# Patient Record
Sex: FEMALE | Race: BLACK OR AFRICAN AMERICAN | Employment: OTHER | URBAN - METROPOLITAN AREA
[De-identification: names, ages, dates, MRNs, and addresses within clinical notes are randomized per-mention and may not be internally consistent; named-entity substitution may affect disease eponyms.]

---

## 2023-09-30 ENCOUNTER — APPOINTMENT (OUTPATIENT)
Dept: CT IMAGING | Age: 88
End: 2023-09-30

## 2023-09-30 ENCOUNTER — HOSPITAL ENCOUNTER (EMERGENCY)
Age: 88
Discharge: ANOTHER HOSPITAL | End: 2023-10-01
Attending: EMERGENCY MEDICINE

## 2023-09-30 DIAGNOSIS — I71.23 ANEURYSM OF DESCENDING THORACIC AORTA, UNSPECIFIED WHETHER RUPTURED (HCC): ICD-10-CM

## 2023-09-30 DIAGNOSIS — R04.2 HEMOPTYSIS: Primary | ICD-10-CM

## 2023-09-30 LAB
ALBUMIN SERPL-MCNC: 3.7 G/DL (ref 3.5–4.6)
ALP SERPL-CCNC: 70 U/L (ref 40–130)
ALT SERPL-CCNC: 12 U/L (ref 0–33)
ANION GAP SERPL CALCULATED.3IONS-SCNC: 6 MEQ/L (ref 9–15)
AST SERPL-CCNC: 17 U/L (ref 0–35)
BASOPHILS # BLD: 0 K/UL (ref 0–0.2)
BASOPHILS NFR BLD: 0.5 %
BILIRUB SERPL-MCNC: 0.3 MG/DL (ref 0.2–0.7)
BUN SERPL-MCNC: 18 MG/DL (ref 8–23)
CALCIUM SERPL-MCNC: 8.9 MG/DL (ref 8.5–9.9)
CHLORIDE SERPL-SCNC: 107 MEQ/L (ref 95–107)
CO2 SERPL-SCNC: 24 MEQ/L (ref 20–31)
CREAT SERPL-MCNC: 1.04 MG/DL (ref 0.5–0.9)
EOSINOPHIL # BLD: 0.1 K/UL (ref 0–0.7)
EOSINOPHIL NFR BLD: 2.1 %
ERYTHROCYTE [DISTWIDTH] IN BLOOD BY AUTOMATED COUNT: 13.6 % (ref 11.5–14.5)
GLOBULIN SER CALC-MCNC: 2.8 G/DL (ref 2.3–3.5)
GLUCOSE SERPL-MCNC: 112 MG/DL (ref 70–99)
HCT VFR BLD AUTO: 32.6 % (ref 37–47)
HGB BLD-MCNC: 10.6 G/DL (ref 12–16)
INR PPP: 1.2
LYMPHOCYTES # BLD: 1.9 K/UL (ref 1–4.8)
LYMPHOCYTES NFR BLD: 44.4 %
MCH RBC QN AUTO: 30.2 PG (ref 27–31.3)
MCHC RBC AUTO-ENTMCNC: 32.5 % (ref 33–37)
MCV RBC AUTO: 92.9 FL (ref 79.4–94.8)
MONOCYTES # BLD: 0.2 K/UL (ref 0.2–0.8)
MONOCYTES NFR BLD: 5.4 %
NEUTROPHILS # BLD: 2 K/UL (ref 1.4–6.5)
NEUTS SEG NFR BLD: 46.9 %
PLATELET # BLD AUTO: 128 K/UL (ref 130–400)
POC CREATININE WHOLE BLOOD: 1
POTASSIUM SERPL-SCNC: 4.8 MEQ/L (ref 3.4–4.9)
PROT SERPL-MCNC: 6.5 G/DL (ref 6.3–8)
PROTHROMBIN TIME: 15.1 SEC (ref 12.3–14.9)
RBC # BLD AUTO: 3.51 M/UL (ref 4.2–5.4)
REASON FOR REJECTION: NORMAL
REJECTED TEST: NORMAL
SODIUM SERPL-SCNC: 137 MEQ/L (ref 135–144)
TROPONIN T SERPL-MCNC: <0.01 NG/ML (ref 0–0.01)
WBC # BLD AUTO: 4.3 K/UL (ref 4.8–10.8)

## 2023-09-30 PROCEDURE — 85610 PROTHROMBIN TIME: CPT

## 2023-09-30 PROCEDURE — 84484 ASSAY OF TROPONIN QUANT: CPT

## 2023-09-30 PROCEDURE — 99285 EMERGENCY DEPT VISIT HI MDM: CPT

## 2023-09-30 PROCEDURE — 85025 COMPLETE CBC W/AUTO DIFF WBC: CPT

## 2023-09-30 PROCEDURE — 6360000004 HC RX CONTRAST MEDICATION: Performed by: EMERGENCY MEDICINE

## 2023-09-30 PROCEDURE — 71275 CT ANGIOGRAPHY CHEST: CPT

## 2023-09-30 PROCEDURE — 36415 COLL VENOUS BLD VENIPUNCTURE: CPT

## 2023-09-30 PROCEDURE — 80053 COMPREHEN METABOLIC PANEL: CPT

## 2023-09-30 RX ORDER — LABETALOL HYDROCHLORIDE 5 MG/ML
10 INJECTION, SOLUTION INTRAVENOUS
Status: DISCONTINUED | OUTPATIENT
Start: 2023-09-30 | End: 2023-10-01 | Stop reason: HOSPADM

## 2023-09-30 RX ADMIN — IOPAMIDOL 75 ML: 612 INJECTION, SOLUTION INTRAVENOUS at 14:17

## 2023-09-30 ASSESSMENT — ENCOUNTER SYMPTOMS
SHORTNESS OF BREATH: 0
EYE PAIN: 0
NAUSEA: 0
SORE THROAT: 0
VOMITING: 0
CHEST TIGHTNESS: 0
ABDOMINAL PAIN: 0
COUGH: 1

## 2023-09-30 ASSESSMENT — LIFESTYLE VARIABLES
HOW OFTEN DO YOU HAVE A DRINK CONTAINING ALCOHOL: NEVER
HOW MANY STANDARD DRINKS CONTAINING ALCOHOL DO YOU HAVE ON A TYPICAL DAY: PATIENT DOES NOT DRINK

## 2023-09-30 ASSESSMENT — PAIN - FUNCTIONAL ASSESSMENT: PAIN_FUNCTIONAL_ASSESSMENT: NONE - DENIES PAIN

## 2023-09-30 NOTE — ED PROVIDER NOTES
alert and oriented to person, place, and time. Cranial Nerves: No cranial nerve deficit. Psychiatric:         Behavior: Behavior normal.         DIAGNOSTIC RESULTS     EKG: All EKG's are interpreted by the Emergency Department Physician who either signs or Co-signs this chart in the absence of a cardiologist.    Sinus bradycardia with PACs 59 bpm left axis deviation no acute ischemia read by ER physician    RADIOLOGY:   Non-plain film images such as CT, Ultrasound and MRI are read by the radiologist. Plain radiographic images are visualized and preliminarily interpreted by the emergency physician with the below findings:        Interpretation per the Radiologist below, if available at the time of this note:    CTA CHEST W WO CONTRAST PE Eval   Final Result   1. No evidence of pulmonary embolism      2. 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 There is a mild amount of mural thrombus. No evidence of mediastinal   hematoma. FINDINGS:  Aorta: Ascending aorta is normal in diameter measuring 3.5 cm. Just distal  to the left subclavian artery there is a large diameter somewhat short  segment aneurysmal dilatation of the thoracic aorta measuring up to 6.0 x 5.5  cm in diameter and approximately 7 cm in length. There is mild mural  thrombus. No evidence of mediastinal hematoma. Pulmonary artery: There are no filling defects within the pulmonary artery to  suggest pulmonary embolism. Pulmonary artery trunk is normal in size. There  is some dilatation of the right and left main pulmonary artery. Mediastinum: Heart appears normal in size. No pericardial effusion detected. There is minimal calcific ASVD of the coronary vessels. No evidence of  pathologic mediastinal lymphadenopathy. No axillary adenopathy.      Lungs/Pleura: Patchy areas of alveolar opacity are noted with a

## 2023-10-01 VITALS
OXYGEN SATURATION: 100 % | SYSTOLIC BLOOD PRESSURE: 144 MMHG | HEART RATE: 53 BPM | BODY MASS INDEX: 23.9 KG/M2 | TEMPERATURE: 99 F | HEIGHT: 64 IN | DIASTOLIC BLOOD PRESSURE: 65 MMHG | RESPIRATION RATE: 14 BRPM | WEIGHT: 140 LBS

## 2023-10-01 LAB
PERFORMED ON: ABNORMAL
POC CREATININE: 1 MG/DL (ref 0.6–1.2)
POC SAMPLE TYPE: ABNORMAL

## 2023-10-01 NOTE — ED NOTES
Report given to JANICE Tamayo taking care of pt going to [address] [place] [address] bed 4. No acute distress noted at this time. Resps even non labored.   Skin p/w/d.  VSS     Marlen Mcleod RN  10/01/23 2816

## 2023-10-01 NOTE — ED NOTES
Still waiting for bed at [facility]. Patient updated. Patient resting comfortably at this time.       Clarence Parra RN  09/30/23 2046

## 2023-10-01 NOTE — ED NOTES
Called [address] to have them contact [facility] transfer center for our attending to speak to.      Tawny MARISCAL Page  09/30/23 2030

## 2023-10-06 LAB
EKG ATRIAL RATE: 59 BPM
EKG P AXIS: 60 DEGREES
EKG P-R INTERVAL: 182 MS
EKG Q-T INTERVAL: 428 MS
EKG QRS DURATION: 80 MS
EKG QTC CALCULATION (BAZETT): 423 MS
EKG R AXIS: -38 DEGREES
EKG T AXIS: 63 DEGREES
EKG VENTRICULAR RATE: 59 BPM

## 2024-06-26 ENCOUNTER — APPOINTMENT (OUTPATIENT)
Dept: CT IMAGING | Age: 89
DRG: 481 | End: 2024-06-26
Payer: MEDICARE

## 2024-06-26 ENCOUNTER — APPOINTMENT (OUTPATIENT)
Dept: GENERAL RADIOLOGY | Age: 89
DRG: 481 | End: 2024-06-26
Payer: MEDICARE

## 2024-06-26 ENCOUNTER — HOSPITAL ENCOUNTER (INPATIENT)
Age: 89
LOS: 5 days | Discharge: NURSING, REHAB OR LONG-TERM CARE | DRG: 481 | End: 2024-07-01
Attending: EMERGENCY MEDICINE | Admitting: SURGERY
Payer: MEDICARE

## 2024-06-26 DIAGNOSIS — S72.145A CLOSED NONDISPLACED INTERTROCHANTERIC FRACTURE OF LEFT FEMUR, INITIAL ENCOUNTER (HCC): ICD-10-CM
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
## 2024-06-26 DIAGNOSIS — G89.11 ACUTE PAIN DUE TO TRAUMA: ICD-10-CM

## 2024-06-26 DIAGNOSIS — G89.18 ACUTE POST-OPERATIVE PAIN: ICD-10-CM

## 2024-06-26 PROBLEM — S72.142A INTERTROCHANTERIC FRACTURE, CLOSED, LEFT, INITIAL ENCOUNTER (HCC): Status: ACTIVE | Noted: 2024-06-26

## 2024-06-26 LAB
ALBUMIN SERPL-MCNC: 4 G/DL (ref 3.5–4.6)
ALP SERPL-CCNC: 80 U/L (ref 40–130)
ALT SERPL-CCNC: 15 U/L (ref 0–33)
ANION GAP SERPL CALCULATED.3IONS-SCNC: 12 MEQ/L (ref 9–15)
AST SERPL-CCNC: 19 U/L (ref 0–35)
BASOPHILS # BLD: 0 K/UL (ref 0–0.2)
BASOPHILS NFR BLD: 0.4 %
BILIRUB SERPL-MCNC: 0.5 MG/DL (ref 0.2–0.7)
BUN SERPL-MCNC: 21 MG/DL (ref 8–23)
CALCIUM SERPL-MCNC: 9.2 MG/DL (ref 8.5–9.9)
CHLORIDE SERPL-SCNC: 106 MEQ/L (ref 95–107)
CO2 SERPL-SCNC: 23 MEQ/L (ref 20–31)
CREAT SERPL-MCNC: 1.1 MG/DL (ref 0.5–0.9)
EOSINOPHIL # BLD: 0.1 K/UL (ref 0–0.7)
EOSINOPHIL NFR BLD: 1.5 %
ERYTHROCYTE [DISTWIDTH] IN BLOOD BY AUTOMATED COUNT: 13.7 % (ref 11.5–14.5)
GLOBULIN SER CALC-MCNC: 2.8 G/DL (ref 2.3–3.5)
GLUCOSE SERPL-MCNC: 107 MG/DL (ref 70–99)
HCT VFR BLD AUTO: 35 % (ref 37–47)
HGB BLD-MCNC: 11.3 G/DL (ref 12–16)
INR PPP: 1.1
LYMPHOCYTES # BLD: 1.7 K/UL (ref 1–4.8)
LYMPHOCYTES NFR BLD: 35.9 %
MAGNESIUM SERPL-MCNC: 2.2 MG/DL (ref 1.7–2.4)
MCH RBC QN AUTO: 30.1 PG (ref 27–31.3)
MCHC RBC AUTO-ENTMCNC: 32.3 % (ref 33–37)
MCV RBC AUTO: 93.1 FL (ref 79.4–94.8)
MONOCYTES # BLD: 0.3 K/UL (ref 0.2–0.8)
MONOCYTES NFR BLD: 6.3 %
NEUTROPHILS # BLD: 2.6 K/UL (ref 1.4–6.5)
NEUTS SEG NFR BLD: 55.3 %
PERFORMED ON: ABNORMAL
PLATELET # BLD AUTO: 122 K/UL (ref 130–400)
POC CREATININE: 1.2 MG/DL (ref 0.6–1.2)
POC SAMPLE TYPE: ABNORMAL
POTASSIUM SERPL-SCNC: 4.8 MEQ/L (ref 3.4–4.9)
PROT SERPL-MCNC: 6.8 G/DL (ref 6.3–8)
PROTHROMBIN TIME: 14.2 SEC (ref 12.3–14.9)
RBC # BLD AUTO: 3.76 M/UL (ref 4.2–5.4)
SODIUM SERPL-SCNC: 141 MEQ/L (ref 135–144)
TROPONIN, HIGH SENSITIVITY: 16 NG/L (ref 0–19)
WBC # BLD AUTO: 4.7 K/UL (ref 4.8–10.8)

## 2024-06-26 PROCEDURE — 36415 COLL VENOUS BLD VENIPUNCTURE: CPT

## 2024-06-26 PROCEDURE — 96374 THER/PROPH/DIAG INJ IV PUSH: CPT

## 2024-06-26 PROCEDURE — 85610 PROTHROMBIN TIME: CPT

## 2024-06-26 PROCEDURE — 93005 ELECTROCARDIOGRAM TRACING: CPT | Performed by: EMERGENCY MEDICINE

## 2024-06-26 PROCEDURE — 71045 X-RAY EXAM CHEST 1 VIEW: CPT

## 2024-06-26 PROCEDURE — 74177 CT ABD & PELVIS W/CONTRAST: CPT

## 2024-06-26 PROCEDURE — 99221 1ST HOSP IP/OBS SF/LOW 40: CPT | Performed by: SURGERY

## 2024-06-26 PROCEDURE — 85025 COMPLETE CBC W/AUTO DIFF WBC: CPT

## 2024-06-26 PROCEDURE — 83735 ASSAY OF MAGNESIUM: CPT

## 2024-06-26 PROCEDURE — 6360000002 HC RX W HCPCS: Performed by: EMERGENCY MEDICINE

## 2024-06-26 PROCEDURE — 73552 X-RAY EXAM OF FEMUR 2/>: CPT

## 2024-06-26 PROCEDURE — 70450 CT HEAD/BRAIN W/O DYE: CPT

## 2024-06-26 PROCEDURE — 6830039000 HC L3 TRAUMA ALERT

## 2024-06-26 PROCEDURE — 6360000004 HC RX CONTRAST MEDICATION: Performed by: EMERGENCY MEDICINE

## 2024-06-26 PROCEDURE — 80053 COMPREHEN METABOLIC PANEL: CPT

## 2024-06-26 PROCEDURE — 99285 EMERGENCY DEPT VISIT HI MDM: CPT

## 2024-06-26 PROCEDURE — 1210000000 HC MED SURG R&B

## 2024-06-26 PROCEDURE — 72125 CT NECK SPINE W/O DYE: CPT

## 2024-06-26 PROCEDURE — 96375 TX/PRO/DX INJ NEW DRUG ADDON: CPT

## 2024-06-26 PROCEDURE — 73501 X-RAY EXAM HIP UNI 1 VIEW: CPT

## 2024-06-26 PROCEDURE — 6370000000 HC RX 637 (ALT 250 FOR IP): Performed by: SURGERY

## 2024-06-26 PROCEDURE — 2580000003 HC RX 258: Performed by: SURGERY

## 2024-06-26 PROCEDURE — 84484 ASSAY OF TROPONIN QUANT: CPT

## 2024-06-26 PROCEDURE — 71260 CT THORAX DX C+: CPT

## 2024-06-26 RX ORDER — SENNA AND DOCUSATE SODIUM 50; 8.6 MG/1; MG/1
1 TABLET, FILM COATED ORAL 2 TIMES DAILY
Status: DISCONTINUED | OUTPATIENT
Start: 2024-06-26 | End: 2024-07-01 | Stop reason: HOSPADM

## 2024-06-26 RX ORDER — LISINOPRIL 20 MG/1
20 TABLET ORAL NIGHTLY
Status: ON HOLD | COMMUNITY
End: 2024-06-26

## 2024-06-26 RX ORDER — METOPROLOL TARTRATE 50 MG/1
50 TABLET, FILM COATED ORAL 2 TIMES DAILY
Status: DISCONTINUED | OUTPATIENT
Start: 2024-06-26 | End: 2024-07-01 | Stop reason: HOSPADM

## 2024-06-26 RX ORDER — LISINOPRIL 20 MG/1
20 TABLET ORAL DAILY
COMMUNITY
Start: 2023-10-18

## 2024-06-26 RX ORDER — SODIUM CHLORIDE 0.9 % (FLUSH) 0.9 %
5-40 SYRINGE (ML) INJECTION EVERY 12 HOURS SCHEDULED
Status: DISCONTINUED | OUTPATIENT
Start: 2024-06-26 | End: 2024-07-01 | Stop reason: HOSPADM

## 2024-06-26 RX ORDER — ATORVASTATIN CALCIUM 40 MG/1
40 TABLET, FILM COATED ORAL NIGHTLY
Status: DISCONTINUED | OUTPATIENT
Start: 2024-06-26 | End: 2024-07-01 | Stop reason: HOSPADM

## 2024-06-26 RX ORDER — OXYCODONE HYDROCHLORIDE 5 MG/1
5 TABLET ORAL EVERY 4 HOURS PRN
Status: DISCONTINUED | OUTPATIENT
Start: 2024-06-26 | End: 2024-06-27

## 2024-06-26 RX ORDER — SODIUM CHLORIDE, SODIUM LACTATE, POTASSIUM CHLORIDE, CALCIUM CHLORIDE 600; 310; 30; 20 MG/100ML; MG/100ML; MG/100ML; MG/100ML
INJECTION, SOLUTION INTRAVENOUS CONTINUOUS
Status: DISCONTINUED | OUTPATIENT
Start: 2024-06-26 | End: 2024-06-29

## 2024-06-26 RX ORDER — FENTANYL CITRATE 0.05 MG/ML
50 INJECTION, SOLUTION INTRAMUSCULAR; INTRAVENOUS ONCE
Status: COMPLETED | OUTPATIENT
Start: 2024-06-26 | End: 2024-06-26

## 2024-06-26 RX ORDER — ATORVASTATIN CALCIUM 40 MG/1
40 TABLET, FILM COATED ORAL NIGHTLY
Status: ON HOLD | COMMUNITY
End: 2024-06-26

## 2024-06-26 RX ORDER — ACETAMINOPHEN 325 MG/1
650 TABLET ORAL EVERY 6 HOURS
Status: DISCONTINUED | OUTPATIENT
Start: 2024-06-26 | End: 2024-07-01 | Stop reason: HOSPADM

## 2024-06-26 RX ORDER — ONDANSETRON 2 MG/ML
4 INJECTION INTRAMUSCULAR; INTRAVENOUS EVERY 6 HOURS PRN
Status: DISCONTINUED | OUTPATIENT
Start: 2024-06-26 | End: 2024-07-01 | Stop reason: HOSPADM

## 2024-06-26 RX ORDER — METOPROLOL TARTRATE 50 MG/1
50 TABLET, FILM COATED ORAL 2 TIMES DAILY
Status: ON HOLD | COMMUNITY
End: 2024-06-26

## 2024-06-26 RX ORDER — AMLODIPINE BESYLATE 5 MG/1
5 TABLET ORAL DAILY
Status: DISCONTINUED | OUTPATIENT
Start: 2024-06-27 | End: 2024-07-01 | Stop reason: HOSPADM

## 2024-06-26 RX ORDER — AMLODIPINE BESYLATE 5 MG/1
1 TABLET ORAL DAILY
COMMUNITY
Start: 2023-09-14

## 2024-06-26 RX ORDER — ATORVASTATIN CALCIUM 40 MG/1
40 TABLET, FILM COATED ORAL NIGHTLY
COMMUNITY
Start: 2024-04-05

## 2024-06-26 RX ORDER — ASPIRIN 81 MG/1
81 TABLET, CHEWABLE ORAL DAILY
Status: ON HOLD | COMMUNITY
End: 2024-07-01

## 2024-06-26 RX ORDER — ONDANSETRON 4 MG/1
4 TABLET, ORALLY DISINTEGRATING ORAL EVERY 8 HOURS PRN
Status: DISCONTINUED | OUTPATIENT
Start: 2024-06-26 | End: 2024-07-01 | Stop reason: HOSPADM

## 2024-06-26 RX ORDER — AMLODIPINE BESYLATE 5 MG/1
5 TABLET ORAL DAILY
Status: ON HOLD | COMMUNITY
End: 2024-06-26

## 2024-06-26 RX ORDER — MORPHINE SULFATE 4 MG/ML
4 INJECTION, SOLUTION INTRAMUSCULAR; INTRAVENOUS ONCE
Status: COMPLETED | OUTPATIENT
Start: 2024-06-26 | End: 2024-06-26

## 2024-06-26 RX ORDER — METOPROLOL TARTRATE 50 MG/1
50 TABLET, FILM COATED ORAL 2 TIMES DAILY
COMMUNITY
Start: 2024-06-24 | End: 2024-09-22

## 2024-06-26 RX ORDER — ACETAMINOPHEN 325 MG/1
650 TABLET ORAL EVERY 4 HOURS PRN
COMMUNITY
Start: 2023-10-09

## 2024-06-26 RX ORDER — SODIUM CHLORIDE 9 MG/ML
INJECTION, SOLUTION INTRAVENOUS PRN
Status: DISCONTINUED | OUTPATIENT
Start: 2024-06-26 | End: 2024-07-01 | Stop reason: HOSPADM

## 2024-06-26 RX ORDER — OXYCODONE HYDROCHLORIDE 5 MG/1
2.5 TABLET ORAL EVERY 4 HOURS PRN
Status: DISCONTINUED | OUTPATIENT
Start: 2024-06-26 | End: 2024-06-27

## 2024-06-26 RX ORDER — SODIUM CHLORIDE 0.9 % (FLUSH) 0.9 %
5-40 SYRINGE (ML) INJECTION PRN
Status: DISCONTINUED | OUTPATIENT
Start: 2024-06-26 | End: 2024-07-01 | Stop reason: HOSPADM

## 2024-06-26 RX ORDER — ASPIRIN 81 MG/1
81 TABLET, CHEWABLE ORAL DAILY
Status: ON HOLD | COMMUNITY
End: 2024-06-26

## 2024-06-26 RX ORDER — AMOXICILLIN 250 MG
2 CAPSULE ORAL 2 TIMES DAILY PRN
COMMUNITY
Start: 2023-10-09

## 2024-06-26 RX ADMIN — IOPAMIDOL 75 ML: 755 INJECTION, SOLUTION INTRAVENOUS at 20:40

## 2024-06-26 RX ADMIN — SENNOSIDES AND DOCUSATE SODIUM 1 TABLET: 50; 8.6 TABLET ORAL at 23:22

## 2024-06-26 RX ADMIN — FENTANYL CITRATE 50 MCG: 50 INJECTION INTRAMUSCULAR; INTRAVENOUS at 21:30

## 2024-06-26 RX ADMIN — ATORVASTATIN CALCIUM 40 MG: 40 TABLET, FILM COATED ORAL at 23:22

## 2024-06-26 RX ADMIN — METOPROLOL TARTRATE 50 MG: 50 TABLET, FILM COATED ORAL at 23:22

## 2024-06-26 RX ADMIN — SODIUM CHLORIDE, POTASSIUM CHLORIDE, SODIUM LACTATE AND CALCIUM CHLORIDE: 600; 310; 30; 20 INJECTION, SOLUTION INTRAVENOUS at 23:19

## 2024-06-26 RX ADMIN — MORPHINE SULFATE 4 MG: 4 INJECTION, SOLUTION INTRAMUSCULAR; INTRAVENOUS at 19:56

## 2024-06-26 RX ADMIN — ACETAMINOPHEN 325MG 650 MG: 325 TABLET ORAL at 23:22

## 2024-06-26 RX ADMIN — Medication 10 ML: at 23:25

## 2024-06-26 ASSESSMENT — PAIN DESCRIPTION - ORIENTATION
ORIENTATION: LEFT

## 2024-06-26 ASSESSMENT — PAIN DESCRIPTION - DESCRIPTORS
DESCRIPTORS: BURNING
DESCRIPTORS: ACHING

## 2024-06-26 ASSESSMENT — PAIN SCALES - GENERAL
PAINLEVEL_OUTOF10: 6
PAINLEVEL_OUTOF10: 7
PAINLEVEL_OUTOF10: 7
PAINLEVEL_OUTOF10: 4
PAINLEVEL_OUTOF10: 9

## 2024-06-26 ASSESSMENT — PAIN DESCRIPTION - LOCATION
LOCATION: HIP

## 2024-06-26 ASSESSMENT — PAIN DESCRIPTION - PAIN TYPE: TYPE: ACUTE PAIN

## 2024-06-26 ASSESSMENT — PAIN - FUNCTIONAL ASSESSMENT
PAIN_FUNCTIONAL_ASSESSMENT: ACTIVITIES ARE NOT PREVENTED
PAIN_FUNCTIONAL_ASSESSMENT: 0-10

## 2024-06-26 ASSESSMENT — PAIN DESCRIPTION - ONSET: ONSET: OTHER (COMMENT)

## 2024-06-26 NOTE — ED PROVIDER NOTES
significant displacement.  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     No superimposed acute cardiopulmonary process. [SG]   2121 XR FEMUR LEFT (MIN 2 VIEWS)  IMPRESSION:     1.  Intratrochanteric fracture of the left proximal femur without significant  angulation and with slight displacement of the lesser trochanter fragment.     2.  Chondrocalcinosis within the knee.      [SG]   2121 XR HIP 1 VW W PELVIS LEFT  IMPRESSION:     Intratrochanteric fracture of the left proximal femur with slight  displacement of the lesser trochanter fragment but no significant angulation.  Femoral head to acetabular alignment remains intact. [SG]   2128 Discussed case w/ ortho and trauma regarding patient and are agreeable with consult and admission respectively.  [SG]   2130 Updated patient and family regarding the imaging results [SG]      ED Course User Index  [SG] Luis Powell MD       CONSULTS:  None    PROCEDURES:  Unless otherwise noted below, none     Procedures    FINAL IMPRESSION    No diagnosis found.      DISPOSITION/PLAN   DISPOSITION        PATIENT REFERRED TO:  No follow-up provider specified.    DISCHARGE MEDICATIONS:  New Prescriptions    No medications on file     Controlled Substances Monitoring:          No data to display                (Please note that portions of this note were completed with a voice recognition program.  Efforts were made to edit the dictations but occasionally words are mis-transcribed.)    Luis Powell MD (electronically signed)  Attending Emergency Physician     Luis Powell MD  06/26/24 9570

## 2024-06-26 NOTE — ED TRIAGE NOTES
Pt to ER per  EMS with C/O left hip pain after a fall today. Pt states she was on the phone and when she turned around she said she blacked out and started to fall. On arrival pt left leg deformity noted with external rotation. Left foot pedal pulse palpable. PT was given zofran 4 mg IV and fentanyl 100 mcg IV prior to ER arrival.

## 2024-06-27 ENCOUNTER — ANESTHESIA (OUTPATIENT)
Dept: OPERATING ROOM | Age: 89
End: 2024-06-27
Payer: MEDICARE

## 2024-06-27 ENCOUNTER — ANESTHESIA EVENT (OUTPATIENT)
Dept: OPERATING ROOM | Age: 89
End: 2024-06-27
Payer: MEDICARE

## 2024-06-27 ENCOUNTER — APPOINTMENT (OUTPATIENT)
Dept: GENERAL RADIOLOGY | Age: 89
DRG: 481 | End: 2024-06-27
Payer: MEDICARE
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
## 2024-06-27 PROBLEM — G89.11 ACUTE PAIN DUE TO TRAUMA: Status: ACTIVE | Noted: 2024-06-26

## 2024-06-27 LAB
ABO + RH BLD: NORMAL
ANION GAP SERPL CALCULATED.3IONS-SCNC: 10 MEQ/L (ref 9–15)
BASOPHILS # BLD: 0 K/UL (ref 0–0.2)
BASOPHILS NFR BLD: 0.1 %
BLD GP AB SCN SERPL QL: NORMAL
BUN SERPL-MCNC: 22 MG/DL (ref 8–23)
CALCIUM SERPL-MCNC: 8.6 MG/DL (ref 8.5–9.9)
CHLORIDE SERPL-SCNC: 108 MEQ/L (ref 95–107)
CO2 SERPL-SCNC: 23 MEQ/L (ref 20–31)
CREAT SERPL-MCNC: 1.14 MG/DL (ref 0.5–0.9)
EKG ATRIAL RATE: 62 BPM
EKG P AXIS: 80 DEGREES
EKG P-R INTERVAL: 170 MS
EKG Q-T INTERVAL: 416 MS
EKG QRS DURATION: 76 MS
EKG QTC CALCULATION (BAZETT): 422 MS
EKG R AXIS: -35 DEGREES
EKG T AXIS: 43 DEGREES
EKG VENTRICULAR RATE: 62 BPM
EOSINOPHIL # BLD: 0 K/UL (ref 0–0.7)
EOSINOPHIL NFR BLD: 0 %
ERYTHROCYTE [DISTWIDTH] IN BLOOD BY AUTOMATED COUNT: 13.7 % (ref 11.5–14.5)
ETHANOL PERCENT: NORMAL G/DL
ETHANOLAMINE SERPL-MCNC: <10 MG/DL (ref 0–0.08)
GLUCOSE SERPL-MCNC: 121 MG/DL (ref 70–99)
HCT VFR BLD AUTO: 29.5 % (ref 37–47)
HGB BLD-MCNC: 9.5 G/DL (ref 12–16)
LYMPHOCYTES # BLD: 1.4 K/UL (ref 1–4.8)
LYMPHOCYTES NFR BLD: 18.6 %
MCH RBC QN AUTO: 29.7 PG (ref 27–31.3)
MCHC RBC AUTO-ENTMCNC: 32.2 % (ref 33–37)
MCV RBC AUTO: 92.2 FL (ref 79.4–94.8)
MONOCYTES # BLD: 0.5 K/UL (ref 0.2–0.8)
MONOCYTES NFR BLD: 6.8 %
NEUTROPHILS # BLD: 5.4 K/UL (ref 1.4–6.5)
NEUTS SEG NFR BLD: 74.1 %
PLATELET # BLD AUTO: 125 K/UL (ref 130–400)
POTASSIUM SERPL-SCNC: 4.9 MEQ/L (ref 3.4–4.9)
RBC # BLD AUTO: 3.2 M/UL (ref 4.2–5.4)
SODIUM SERPL-SCNC: 141 MEQ/L (ref 135–144)
WBC # BLD AUTO: 7.3 K/UL (ref 4.8–10.8)

## 2024-06-27 PROCEDURE — 86901 BLOOD TYPING SEROLOGIC RH(D): CPT

## 2024-06-27 PROCEDURE — 6360000002 HC RX W HCPCS: Performed by: ANESTHESIOLOGY

## 2024-06-27 PROCEDURE — 2720000010 HC SURG SUPPLY STERILE: Performed by: STUDENT IN AN ORGANIZED HEALTH CARE EDUCATION/TRAINING PROGRAM

## 2024-06-27 PROCEDURE — APPNB15 APP NON BILLABLE TIME 0-15 MINS: Performed by: PHYSICIAN ASSISTANT

## 2024-06-27 PROCEDURE — 6360000002 HC RX W HCPCS: Performed by: NURSE ANESTHETIST, CERTIFIED REGISTERED

## 2024-06-27 PROCEDURE — 85025 COMPLETE CBC W/AUTO DIFF WBC: CPT

## 2024-06-27 PROCEDURE — 6360000002 HC RX W HCPCS: Performed by: ANESTHESIOLOGIST ASSISTANT

## 2024-06-27 PROCEDURE — 30233N1 TRANSFUSION OF NONAUTOLOGOUS RED BLOOD CELLS INTO PERIPHERAL VEIN, PERCUTANEOUS APPROACH: ICD-10-PCS | Performed by: SURGERY

## 2024-06-27 PROCEDURE — 2580000003 HC RX 258: Performed by: PHYSICIAN ASSISTANT

## 2024-06-27 PROCEDURE — 0QS706Z REPOSITION LEFT UPPER FEMUR WITH INTRAMEDULLARY INTERNAL FIXATION DEVICE, OPEN APPROACH: ICD-10-PCS | Performed by: STUDENT IN AN ORGANIZED HEALTH CARE EDUCATION/TRAINING PROGRAM

## 2024-06-27 PROCEDURE — 2580000003 HC RX 258: Performed by: STUDENT IN AN ORGANIZED HEALTH CARE EDUCATION/TRAINING PROGRAM

## 2024-06-27 PROCEDURE — 86923 COMPATIBILITY TEST ELECTRIC: CPT

## 2024-06-27 PROCEDURE — 1210000000 HC MED SURG R&B

## 2024-06-27 PROCEDURE — 2500000003 HC RX 250 WO HCPCS: Performed by: ANESTHESIOLOGY

## 2024-06-27 PROCEDURE — 86850 RBC ANTIBODY SCREEN: CPT

## 2024-06-27 PROCEDURE — 6370000000 HC RX 637 (ALT 250 FOR IP): Performed by: SURGERY

## 2024-06-27 PROCEDURE — 99223 1ST HOSP IP/OBS HIGH 75: CPT | Performed by: ORTHOPAEDIC SURGERY

## 2024-06-27 PROCEDURE — 86900 BLOOD TYPING SEROLOGIC ABO: CPT

## 2024-06-27 PROCEDURE — 3600000014 HC SURGERY LEVEL 4 ADDTL 15MIN: Performed by: STUDENT IN AN ORGANIZED HEALTH CARE EDUCATION/TRAINING PROGRAM

## 2024-06-27 PROCEDURE — P9016 RBC LEUKOCYTES REDUCED: HCPCS

## 2024-06-27 PROCEDURE — 7100000001 HC PACU RECOVERY - ADDTL 15 MIN: Performed by: STUDENT IN AN ORGANIZED HEALTH CARE EDUCATION/TRAINING PROGRAM

## 2024-06-27 PROCEDURE — 6360000002 HC RX W HCPCS: Performed by: STUDENT IN AN ORGANIZED HEALTH CARE EDUCATION/TRAINING PROGRAM

## 2024-06-27 PROCEDURE — 3700000001 HC ADD 15 MINUTES (ANESTHESIA): Performed by: STUDENT IN AN ORGANIZED HEALTH CARE EDUCATION/TRAINING PROGRAM

## 2024-06-27 PROCEDURE — A4217 STERILE WATER/SALINE, 500 ML: HCPCS | Performed by: STUDENT IN AN ORGANIZED HEALTH CARE EDUCATION/TRAINING PROGRAM

## 2024-06-27 PROCEDURE — 64447 NJX AA&/STRD FEMORAL NRV IMG: CPT | Performed by: ANESTHESIOLOGY

## 2024-06-27 PROCEDURE — 94150 VITAL CAPACITY TEST: CPT

## 2024-06-27 PROCEDURE — 6370000000 HC RX 637 (ALT 250 FOR IP): Performed by: PHYSICIAN ASSISTANT

## 2024-06-27 PROCEDURE — 82077 ASSAY SPEC XCP UR&BREATH IA: CPT

## 2024-06-27 PROCEDURE — 93010 ELECTROCARDIOGRAM REPORT: CPT | Performed by: INTERNAL MEDICINE

## 2024-06-27 PROCEDURE — 7100000000 HC PACU RECOVERY - FIRST 15 MIN: Performed by: STUDENT IN AN ORGANIZED HEALTH CARE EDUCATION/TRAINING PROGRAM

## 2024-06-27 PROCEDURE — C1769 GUIDE WIRE: HCPCS | Performed by: STUDENT IN AN ORGANIZED HEALTH CARE EDUCATION/TRAINING PROGRAM

## 2024-06-27 PROCEDURE — 3600000004 HC SURGERY LEVEL 4 BASE: Performed by: STUDENT IN AN ORGANIZED HEALTH CARE EDUCATION/TRAINING PROGRAM

## 2024-06-27 PROCEDURE — 2709999900 HC NON-CHARGEABLE SUPPLY: Performed by: STUDENT IN AN ORGANIZED HEALTH CARE EDUCATION/TRAINING PROGRAM

## 2024-06-27 PROCEDURE — 2500000003 HC RX 250 WO HCPCS: Performed by: STUDENT IN AN ORGANIZED HEALTH CARE EDUCATION/TRAINING PROGRAM

## 2024-06-27 PROCEDURE — 2580000003 HC RX 258: Performed by: NURSE PRACTITIONER

## 2024-06-27 PROCEDURE — 36415 COLL VENOUS BLD VENIPUNCTURE: CPT

## 2024-06-27 PROCEDURE — 80048 BASIC METABOLIC PNL TOTAL CA: CPT

## 2024-06-27 PROCEDURE — 6360000002 HC RX W HCPCS: Performed by: NURSE PRACTITIONER

## 2024-06-27 PROCEDURE — 3700000000 HC ANESTHESIA ATTENDED CARE: Performed by: STUDENT IN AN ORGANIZED HEALTH CARE EDUCATION/TRAINING PROGRAM

## 2024-06-27 PROCEDURE — 2580000003 HC RX 258: Performed by: SURGERY

## 2024-06-27 PROCEDURE — C1713 ANCHOR/SCREW BN/BN,TIS/BN: HCPCS | Performed by: STUDENT IN AN ORGANIZED HEALTH CARE EDUCATION/TRAINING PROGRAM

## 2024-06-27 DEVICE — SCREW BNE L40MM DIA5MM ST TIB LT GRN TI ST CANN LOK FULL: Type: IMPLANTABLE DEVICE | Status: FUNCTIONAL

## 2024-06-27 DEVICE — IMPLANTABLE DEVICE: Type: IMPLANTABLE DEVICE | Status: FUNCTIONAL

## 2024-06-27 RX ORDER — OXYCODONE HYDROCHLORIDE 5 MG/1
7.5 TABLET ORAL EVERY 4 HOURS PRN
Status: DISCONTINUED | OUTPATIENT
Start: 2024-06-27 | End: 2024-06-28

## 2024-06-27 RX ORDER — SODIUM CHLORIDE 0.9 % (FLUSH) 0.9 %
5-40 SYRINGE (ML) INJECTION EVERY 12 HOURS SCHEDULED
Status: DISCONTINUED | OUTPATIENT
Start: 2024-06-27 | End: 2024-06-27 | Stop reason: HOSPADM

## 2024-06-27 RX ORDER — OXYCODONE HYDROCHLORIDE 5 MG/1
5 TABLET ORAL
Status: DISCONTINUED | OUTPATIENT
Start: 2024-06-27 | End: 2024-06-27 | Stop reason: HOSPADM

## 2024-06-27 RX ORDER — LIDOCAINE HYDROCHLORIDE 20 MG/ML
INJECTION, SOLUTION INTRAVENOUS PRN
Status: DISCONTINUED | OUTPATIENT
Start: 2024-06-27 | End: 2024-06-27 | Stop reason: SDUPTHER

## 2024-06-27 RX ORDER — FENTANYL CITRATE 0.05 MG/ML
25 INJECTION, SOLUTION INTRAMUSCULAR; INTRAVENOUS EVERY 10 MIN PRN
Status: DISCONTINUED | OUTPATIENT
Start: 2024-06-27 | End: 2024-06-27 | Stop reason: HOSPADM

## 2024-06-27 RX ORDER — ONDANSETRON 2 MG/ML
4 INJECTION INTRAMUSCULAR; INTRAVENOUS
Status: DISCONTINUED | OUTPATIENT
Start: 2024-06-27 | End: 2024-06-27 | Stop reason: HOSPADM

## 2024-06-27 RX ORDER — METHOCARBAMOL 500 MG/1
500 TABLET, FILM COATED ORAL 4 TIMES DAILY
Status: DISCONTINUED | OUTPATIENT
Start: 2024-06-27 | End: 2024-07-01 | Stop reason: HOSPADM

## 2024-06-27 RX ORDER — DIPHENHYDRAMINE HYDROCHLORIDE 50 MG/ML
12.5 INJECTION INTRAMUSCULAR; INTRAVENOUS
Status: DISCONTINUED | OUTPATIENT
Start: 2024-06-27 | End: 2024-06-27 | Stop reason: HOSPADM

## 2024-06-27 RX ORDER — PROPOFOL 10 MG/ML
INJECTION, EMULSION INTRAVENOUS PRN
Status: DISCONTINUED | OUTPATIENT
Start: 2024-06-27 | End: 2024-06-27 | Stop reason: SDUPTHER

## 2024-06-27 RX ORDER — NALOXONE HYDROCHLORIDE 0.4 MG/ML
INJECTION, SOLUTION INTRAMUSCULAR; INTRAVENOUS; SUBCUTANEOUS PRN
Status: DISCONTINUED | OUTPATIENT
Start: 2024-06-27 | End: 2024-06-27 | Stop reason: HOSPADM

## 2024-06-27 RX ORDER — SODIUM CHLORIDE 0.9 % (FLUSH) 0.9 %
5-40 SYRINGE (ML) INJECTION PRN
Status: DISCONTINUED | OUTPATIENT
Start: 2024-06-27 | End: 2024-06-27 | Stop reason: HOSPADM

## 2024-06-27 RX ORDER — OXYCODONE HYDROCHLORIDE 5 MG/1
5 TABLET ORAL EVERY 4 HOURS PRN
Status: DISCONTINUED | OUTPATIENT
Start: 2024-06-27 | End: 2024-06-28

## 2024-06-27 RX ORDER — ASPIRIN 81 MG/1
81 TABLET, CHEWABLE ORAL DAILY
Status: DISCONTINUED | OUTPATIENT
Start: 2024-06-27 | End: 2024-07-01 | Stop reason: HOSPADM

## 2024-06-27 RX ORDER — METOCLOPRAMIDE HYDROCHLORIDE 5 MG/ML
10 INJECTION INTRAMUSCULAR; INTRAVENOUS
Status: DISCONTINUED | OUTPATIENT
Start: 2024-06-27 | End: 2024-06-27 | Stop reason: HOSPADM

## 2024-06-27 RX ORDER — SODIUM CHLORIDE 9 MG/ML
INJECTION, SOLUTION INTRAVENOUS PRN
Status: DISCONTINUED | OUTPATIENT
Start: 2024-06-27 | End: 2024-06-27 | Stop reason: HOSPADM

## 2024-06-27 RX ORDER — ENOXAPARIN SODIUM 100 MG/ML
30 INJECTION SUBCUTANEOUS 2 TIMES DAILY
Status: DISCONTINUED | OUTPATIENT
Start: 2024-06-28 | End: 2024-07-01 | Stop reason: HOSPADM

## 2024-06-27 RX ORDER — LISINOPRIL 20 MG/1
20 TABLET ORAL DAILY
Status: DISCONTINUED | OUTPATIENT
Start: 2024-06-27 | End: 2024-06-29

## 2024-06-27 RX ORDER — MAGNESIUM HYDROXIDE 1200 MG/15ML
LIQUID ORAL CONTINUOUS PRN
Status: COMPLETED | OUTPATIENT
Start: 2024-06-27 | End: 2024-06-27

## 2024-06-27 RX ORDER — LIDOCAINE 4 G/G
2 PATCH TOPICAL DAILY
Status: DISCONTINUED | OUTPATIENT
Start: 2024-06-27 | End: 2024-07-01 | Stop reason: HOSPADM

## 2024-06-27 RX ORDER — LIDOCAINE HYDROCHLORIDE AND EPINEPHRINE 10; 10 MG/ML; UG/ML
INJECTION, SOLUTION INFILTRATION; PERINEURAL PRN
Status: DISCONTINUED | OUTPATIENT
Start: 2024-06-27 | End: 2024-06-27 | Stop reason: SDUPTHER

## 2024-06-27 RX ORDER — ROPIVACAINE HYDROCHLORIDE 5 MG/ML
INJECTION, SOLUTION EPIDURAL; INFILTRATION; PERINEURAL PRN
Status: DISCONTINUED | OUTPATIENT
Start: 2024-06-27 | End: 2024-06-27 | Stop reason: SDUPTHER

## 2024-06-27 RX ADMIN — PROPOFOL 30 MG: 10 INJECTION, EMULSION INTRAVENOUS at 15:35

## 2024-06-27 RX ADMIN — ACETAMINOPHEN 325MG 650 MG: 325 TABLET ORAL at 10:57

## 2024-06-27 RX ADMIN — TRANEXAMIC ACID 1000 MG: 100 INJECTION, SOLUTION INTRAVENOUS at 15:35

## 2024-06-27 RX ADMIN — METOPROLOL TARTRATE 50 MG: 50 TABLET, FILM COATED ORAL at 21:08

## 2024-06-27 RX ADMIN — METHOCARBAMOL 500 MG: 500 TABLET ORAL at 21:08

## 2024-06-27 RX ADMIN — SODIUM CHLORIDE, POTASSIUM CHLORIDE, SODIUM LACTATE AND CALCIUM CHLORIDE: 600; 310; 30; 20 INJECTION, SOLUTION INTRAVENOUS at 18:30

## 2024-06-27 RX ADMIN — SENNOSIDES AND DOCUSATE SODIUM 1 TABLET: 50; 8.6 TABLET ORAL at 21:09

## 2024-06-27 RX ADMIN — OXYCODONE HYDROCHLORIDE 5 MG: 5 TABLET ORAL at 05:14

## 2024-06-27 RX ADMIN — ACETAMINOPHEN 325MG 650 MG: 325 TABLET ORAL at 05:14

## 2024-06-27 RX ADMIN — PHENYLEPHRINE HYDROCHLORIDE 100 MCG: 10 INJECTION INTRAVENOUS at 16:19

## 2024-06-27 RX ADMIN — TRANEXAMIC ACID 1000 MG: 100 INJECTION, SOLUTION INTRAVENOUS at 16:19

## 2024-06-27 RX ADMIN — PROPOFOL 30 MG: 10 INJECTION, EMULSION INTRAVENOUS at 15:42

## 2024-06-27 RX ADMIN — PROPOFOL 20 MG: 10 INJECTION, EMULSION INTRAVENOUS at 15:20

## 2024-06-27 RX ADMIN — ATORVASTATIN CALCIUM 40 MG: 40 TABLET, FILM COATED ORAL at 21:08

## 2024-06-27 RX ADMIN — CEFAZOLIN 2000 MG: 2 INJECTION, POWDER, FOR SOLUTION INTRAMUSCULAR; INTRAVENOUS at 23:23

## 2024-06-27 RX ADMIN — OXYCODONE HYDROCHLORIDE 5 MG: 5 TABLET ORAL at 11:02

## 2024-06-27 RX ADMIN — CEFAZOLIN 2000 MG: 2 INJECTION, POWDER, FOR SOLUTION INTRAMUSCULAR; INTRAVENOUS at 15:30

## 2024-06-27 RX ADMIN — ACETAMINOPHEN 325MG 650 MG: 325 TABLET ORAL at 21:08

## 2024-06-27 RX ADMIN — LIDOCAINE HYDROCHLORIDE AND EPINEPHRINE 10 ML: 10; 10 INJECTION, SOLUTION INFILTRATION; PERINEURAL at 14:36

## 2024-06-27 RX ADMIN — LIDOCAINE HYDROCHLORIDE 4 MG: 20 INJECTION, SOLUTION INTRAVENOUS at 15:20

## 2024-06-27 RX ADMIN — ROPIVACAINE HYDROCHLORIDE 15 ML: 5 INJECTION, SOLUTION EPIDURAL; INFILTRATION; PERINEURAL at 14:36

## 2024-06-27 RX ADMIN — OXYCODONE HYDROCHLORIDE 5 MG: 5 TABLET ORAL at 18:32

## 2024-06-27 RX ADMIN — PROPOFOL 50 MCG/KG/MIN: 10 INJECTION, EMULSION INTRAVENOUS at 15:21

## 2024-06-27 RX ADMIN — OXYCODONE HYDROCHLORIDE 5 MG: 5 TABLET ORAL at 23:26

## 2024-06-27 RX ADMIN — AMLODIPINE BESYLATE 5 MG: 5 TABLET ORAL at 10:57

## 2024-06-27 RX ADMIN — Medication 10 ML: at 21:09

## 2024-06-27 ASSESSMENT — PAIN SCALES - GENERAL
PAINLEVEL_OUTOF10: 9
PAINLEVEL_OUTOF10: 5
PAINLEVEL_OUTOF10: 7
PAINLEVEL_OUTOF10: 5
PAINLEVEL_OUTOF10: 6

## 2024-06-27 ASSESSMENT — PAIN DESCRIPTION - ORIENTATION
ORIENTATION: LEFT
ORIENTATION: LEFT

## 2024-06-27 ASSESSMENT — PAIN DESCRIPTION - DESCRIPTORS
DESCRIPTORS: DISCOMFORT
DESCRIPTORS: DISCOMFORT

## 2024-06-27 ASSESSMENT — PAIN DESCRIPTION - LOCATION
LOCATION: HIP
LOCATION: HIP;LEG

## 2024-06-27 NOTE — H&P
medications restarted  Medically clear for OR        Maurizio Dumont MD  Trauma/Critical Care/General Surgery  [See treatment team sticky note for contact information]

## 2024-06-27 NOTE — CONSULTS
Consult Note  Patient: Jeannine Moncada  Unit/Bed: W293/W293-01  YOB: 1930  MRN: [id number]  Acct: [id number]   Admitting Diagnosis: Closed nondisplaced intertrochanteric fracture of left femur, initial encounter ([facility]) [S72.145A]  Fall from standing, initial encounter [W19.XXXA]  Intertrochanteric fracture, closed, left, initial encounter ([facility]) [S72.142A]  Date:  6/26/2024  Hospital Day: 1    Complaint:  Fall   Inability to ambulate   Left hip/ groin pain     History of Present Illness:    Jeannine Moncada is a 89 y.o. female who presents to the emergency department with complaints of left leg and left hip pain after a fall where patient had apparently lost her balance.  No loss of consciousness reported.  Not on blood thinners.  Patient denies hitting her head.  Family was in the house but this was not witnessed.  Denies any pain into her neck or back or chest or abdomen.  Unable to bear weight on the left lower extremity.  Deformity noted. Pt is able to recall events   She was seen in Ed and examine later admitted by trauma team and ortho was consulted for the left hip fracture noted on imaging     PMHx:  No past medical history on file.  CAD without angina   Impaired fasting glucose  Memory deficits  Gait abnormality/ frequent falls  Bilateral hearing loss   HTN   HLD  CKD- st 3        PSHx:  No past surgical history on file.    Pt underwent endovascular repair of the proximal to mid descending thoracic aorta dec 2023    Social Hx:Pt never smoked/ occasional drink  Social History     Socioeconomic History    Marital status:      Social Determinants of Health     Food Insecurity: No Food Insecurity (6/26/2024)    Hunger Vital Sign     Worried About Running Out of Food in the Last Year: Never true     Ran Out of Food in the Last Year: Never true   Transportation Needs: No Transportation Needs (6/26/2024)    PRAPARE - Transportation     Lack of Transportation (Medical): No     Lack of Transportation

## 2024-06-27 NOTE — OP NOTE
Surgeon(s)/Proceduralist(s)   Chandni Milan MD      Anesthesia: General     Operation:  Left hip short cephalomedullary nail    Pre-Operative Diagnosis:  Left proximal femur fracture (intertrochanteric)  Post-Operative Diagnosis: Left proximal femur fracture (intertrochanteric)    Operative findings:  No evidence of pathologic frature.    Operative Indications:  Patient with a closed proximal femur fracture.    Implants:  Synthes TFNa, 130 deg, 10mm diameter, 100 mm helical blade, 40 mm distal interlocking screw    Operative Procedure:  Patient was met prior to surgery in pre-operative holding.  The appropriate extremity was marked and recent health status was reviewed and we found no contraindication to proceeding with the scheduled procedure.  We again reviewed the risks of infection, wound issues, deep venous thrombosis, pulmonary embolism, medical complications including cardiac and pulmonary, neurologic and vascular injury.     The patient was then transported to the operating room and underwent general anesthesia.  The patient's ipsilateral leg was placed in the boot and the [place] table was used.  The contralateral leg was flexed and abducted.  Pre-operative images confirmed appropriate reduction on AP/lateral fluoroscopy with longitudinal traction and internal rotation.  The ipsilateral arm was adducted and secured across the chest.     The hip was prepped and draped in the usual sterile fashion, antibiotics and timeout were done per protocol.  After timeout, we confirmed the appropriate starting point.  A small longitudinal  incision was made proximal to the greater trochanter.  After the fascia was split a guide pin was placed at the tip of the greater trochanter and along the anterior 1/3rd in the wilder-posterior plane.  The guidepin was advanced.  An entry reamer was then placed.  The nail was then advanced without issue.  Reaming was not necessary.    A guidepin was placed into the femoral

## 2024-06-27 NOTE — ACP (ADVANCE CARE PLANNING)
Advance Care Planning   Healthcare Decision Maker:    Primary Decision Maker: Constantine Ross - Abdiel - [phone]    Click here to complete Healthcare Decision Makers including selection of the Healthcare Decision Maker Relationship (ie \"Primary\").  Today we documented Decision Maker(s) consistent with Legal Next of Kin hierarchy.

## 2024-06-27 NOTE — ANESTHESIA PRE PROCEDURE
Department of Anesthesiology  Preprocedure Note       Name:  Jeannine Moncada   Age:  89 y.o.  :  9/15/1930                                          MRN:  [id number]         Date:  2024      Surgeon: Surgeon(s):  Chandni Benavides MD    Procedure: Procedure(s):  LEFT INTERTROCHANTERIC FRACTURE TROCHANTERIC FIXATION NAILING  ROOM 293  SIMRAN AWARE    Medications prior to admission:   Prior to Admission medications    Medication Sig Start Date End Date Taking? Authorizing Provider   acetaminophen (TYLENOL) 325 MG tablet Take 2 tablets by mouth every 4 hours as needed 10/9/23  Yes ProviderDenisha MD   metoprolol tartrate (LOPRESSOR) 50 MG tablet Take 1 tablet by mouth 2 times daily 24 Yes ProviderDenisha MD   lisinopril (PRINIVIL;ZESTRIL) 20 MG tablet Take 1 tablet by mouth daily 10/18/23  Yes ProviderDenisha MD   atorvastatin (LIPITOR) 40 MG tablet Take 1 tablet by mouth nightly 24  Yes ProviderDenisha MD   amLODIPine (NORVASC) 5 MG tablet Take 1 tablet by mouth daily 23  Yes Provider, MD Denisha   senna-docusate (PERICOLACE) 8.6-50 MG per tablet Take 2 tablets by mouth 2 times daily as needed for Constipation 10/9/23  Yes Provider, MD Denisha   aspirin 81 MG chewable tablet Take 1 tablet by mouth daily    Provider, MD Denisha       Current medications:    Current Facility-Administered Medications   Medication Dose Route Frequency Provider Last Rate Last Admin    oxyCODONE (ROXICODONE) immediate release tablet 5 mg  5 mg Oral Q4H PRN Earlene Saldivar PA-C   5 mg at 24 1102    Or    oxyCODONE (ROXICODONE) immediate release tablet 7.5 mg  7.5 mg Oral Q4H PRN Earlene Saldivar PA-C        [Held by provider] aspirin chewable tablet 81 mg  81 mg Oral Daily Earlene Saldivar PA-C        [Held by provider] lisinopril (PRINIVIL;ZESTRIL) tablet 20 mg  20 mg Oral Daily Earlene Saldivar PA-C        ceFAZolin (ANCEF) 2,000 mg in 
sodium chloride 0.9 % 100 mL IVPB (mini-bag)  2,000 mg IntraVENous On Call to OR Chandni Benavides MD       • tranexamic acid (CYKLOKAPRON) 1,000 mg in sodium chloride 0.9 % 60 mL IVPB  1,000 mg IntraVENous On Call to OR Chandni Benavides MD       • tranexamic acid (CYKLOKAPRON) 1,000 mg in sodium chloride 0.9 % 60 mL IVPB  1,000 mg IntraVENous On Call to OR Chandni Benavides MD       • methocarbamol (ROBAXIN) tablet 500 mg  500 mg Oral 4x Daily Earlene Saldivar PA-C       • lidocaine 4 % external patch 2 patch  2 patch TransDERmal Daily Earlene Saldivar PA-C   2 patch at 06/27/24 1102   • sodium chloride flush 0.9 % injection 5-40 mL  5-40 mL IntraVENous 2 times per day Maurizio Dumont MD   10 mL at 06/26/24 2325   • sodium chloride flush 0.9 % injection 5-40 mL  5-40 mL IntraVENous PRN Maurizio Dumont MD       • 0.9 % sodium chloride infusion   IntraVENous PRN Maurizio Dumont MD       • ondansetron (ZOFRAN-ODT) disintegrating tablet 4 mg  4 mg Oral Q8H PRN Maurizio Dumont MD        Or   • ondansetron (ZOFRAN) injection 4 mg  4 mg IntraVENous Q6H PRN Maurizio Dumont MD       • lactated ringers IV soln infusion   IntraVENous Continuous Earlene Saldivar PA-C 100 mL/hr at 06/27/24 1047 Rate Change at 06/27/24 1047   • acetaminophen (TYLENOL) tablet 650 mg  650 mg Oral Q6H Maurizio Dumont MD   650 mg at 06/27/24 1057   • HYDROmorphone (DILAUDID) injection 0.25 mg  0.25 mg IntraVENous Q3H PRN Maurizio Dumont MD       • sennosides-docusate sodium (SENOKOT-S) 8.6-50 MG tablet 1 tablet  1 tablet Oral BID Maurizio Dumont MD   1 tablet at 06/26/24 2322   • amLODIPine (NORVASC) tablet 5 mg  5 mg Oral Daily Earlene Saldivar PA-C   5 mg at 06/27/24 1057   • atorvastatin (LIPITOR) tablet 40 mg  40 mg Oral Nightly Maurizio Dumont MD   40 mg at 06/26/24 2322   • metoprolol tartrate (LOPRESSOR) tablet 50 mg  50 mg Oral BID Maurizio Dumont MD   50

## 2024-06-27 NOTE — CARE COORDINATION
Case Management Assessment  Initial Evaluation    Date/Time of Evaluation: 6/27/2024 12:17 PM  Assessment Completed by: Earlene Anguiano    If patient is discharged prior to next notation, then this note serves as note for discharge by case management.    Patient Name: Jeannine Moncada                   YOB: 1930  Diagnosis: Closed nondisplaced intertrochanteric fracture of left femur, initial encounter ([facility]) [S72.145A]  Fall from standing, initial encounter [W19.XXXA]  Intertrochanteric fracture, closed, left, initial encounter ([facility]) [S72.142A]                   Date / Time: 6/26/2024  6:55 PM    Patient Admission Status: Inpatient   Readmission Risk (Low < 19, Mod (19-27), High > 27): Readmission Risk Score: 15.3    Current PCP: No primary care provider on file.  PCP verified by CM? Yes    Chart Reviewed: Yes      History Provided by: Patient  Patient Orientation: Alert and Oriented, Person, Place, Situation, Self    Patient Cognition: Alert    Hospitalization in the last 30 days (Readmission):  No    If yes, Readmission Assessment in  Navigator will be completed.    Advance Directives:      Code Status: Full Code   Patient's Primary Decision Maker is: Legal Next of Kin      Discharge Planning:    Patient lives with: Children Type of Home: House  Primary Care Giver: Self  Patient Support Systems include: Children   Current Financial resources:    Current community resources:    Current services prior to admission: None            Current DME:              Type of Home Care services:  None    ADLS  Prior functional level: Independent in ADLs/IADLs  Current functional level: Assistance with the following:    PT AM-PAC:   /24  OT AM-PAC:   /24    Family can provide assistance at DC: Yes  Would you like Case Management to discuss the discharge plan with any other family members/significant others, and if so, who? Yes (MARISELA - SON)  Plans to Return to Present Housing: Unknown at present  Other

## 2024-06-27 NOTE — ANESTHESIA POSTPROCEDURE EVALUATION
Department of Anesthesiology  Postprocedure Note    Patient: Jeannine Moncada  MRN: [id number]  YOB: 1930  Date of evaluation: 6/27/2024    Procedure Summary       Date: 06/27/24 Room / Location: [address]    Anesthesia Start: 1512 Anesthesia Stop: 1653    Procedure: LEFT INTERTROCHANTERIC FRACTURE TROCHANTERIC FIXATION NAILING  ROOM 293  SIMRAN AWARE (Left) Diagnosis:       Closed nondisplaced intertrochanteric fracture of left femur, initial encounter ([facility])      (Closed nondisplaced intertrochanteric fracture of left femur, initial encounter ([facility]) [S72.145A])    Surgeons: Chandni Benavides MD Responsible Provider: Guillaume De Jesus MD    Anesthesia Type: MAC, regional ASA Status: 3 - Emergent            Anesthesia Type: No value filed.    Maryann Phase I:      Maryann Phase II:      Anesthesia Post Evaluation    Patient location during evaluation: PACU  Patient participation: complete - patient participated  Level of consciousness: awake  Pain score: 0  Airway patency: patent  Nausea & Vomiting: no nausea and no vomiting  Cardiovascular status: hemodynamically stable  Respiratory status: acceptable  Hydration status: euvolemic  Pain management: adequate        No notable events documented.

## 2024-06-27 NOTE — ANESTHESIA PROCEDURE NOTES
Peripheral Block    Patient location during procedure: pre-op  Reason for block: post-op pain management and at surgeon's request  Start time: 6/27/2024 2:33 PM  End time: 6/27/2024 2:38 PM  Staffing  Performed: anesthesiologist   Anesthesiologist: Guillaume De Jesus MD  Performed by: Guillaume De Jesus MD  Authorized by: Guillaume De Jesus MD    Preanesthetic Checklist  Completed: patient identified, IV checked, site marked, risks and benefits discussed, surgical/procedural consents, equipment checked, pre-op evaluation, timeout performed, anesthesia consent given, oxygen available and monitors applied/VS acknowledged  Peripheral Block   Patient position: supine  Prep: ChloraPrep  Provider prep: mask and sterile gloves (Sterile probe cover)  Patient monitoring: cardiac monitor, continuous pulse ox, frequent blood pressure checks and IV access  Block type: PENG  Laterality: left  Injection technique: single-shot  Guidance: ultrasound guided  Local infiltration: lidocaine and ropivacaine  Infiltration strength: 0.5 %  Local infiltration: lidocaine and ropivacaine  Dose: 15 mLDose: 10 mL    Needle   Needle type: combined needle/nerve stimulator   Needle gauge: 21 G  Needle localization: anatomical landmarks and ultrasound guidance  Needle length: 10 cm  Assessment   Injection assessment: negative aspiration for heme, no paresthesia on injection and local visualized surrounding nerve on ultrasound  Paresthesia pain: immediately resolved  Slow fractionated injection: yes  Hemodynamics: stable    Additional Notes  Ultrasound guidance used to view needle for placement.    Ultrasound image stored,  printed and saved in patient chart.    Sterile probe cover used

## 2024-06-28 LAB
ANION GAP SERPL CALCULATED.3IONS-SCNC: 12 MEQ/L (ref 9–15)
BASOPHILS # BLD: 0 K/UL (ref 0–0.2)
BASOPHILS NFR BLD: 0.1 %
BUN SERPL-MCNC: 28 MG/DL (ref 8–23)
CALCIUM SERPL-MCNC: 8.5 MG/DL (ref 8.5–9.9)
CHLORIDE SERPL-SCNC: 103 MEQ/L (ref 95–107)
CO2 SERPL-SCNC: 22 MEQ/L (ref 20–31)
CREAT SERPL-MCNC: 1.39 MG/DL (ref 0.5–0.9)
EOSINOPHIL # BLD: 0 K/UL (ref 0–0.7)
EOSINOPHIL NFR BLD: 0.1 %
ERYTHROCYTE [DISTWIDTH] IN BLOOD BY AUTOMATED COUNT: 13.8 % (ref 11.5–14.5)
GLUCOSE SERPL-MCNC: 158 MG/DL (ref 70–99)
HCT VFR BLD AUTO: 24.4 % (ref 37–47)
HGB BLD-MCNC: 8.1 G/DL (ref 12–16)
LYMPHOCYTES # BLD: 1.6 K/UL (ref 1–4.8)
LYMPHOCYTES NFR BLD: 22 %
MAGNESIUM SERPL-MCNC: 1.9 MG/DL (ref 1.7–2.4)
MCH RBC QN AUTO: 30.6 PG (ref 27–31.3)
MCHC RBC AUTO-ENTMCNC: 33.2 % (ref 33–37)
MCV RBC AUTO: 92.1 FL (ref 79.4–94.8)
MONOCYTES # BLD: 0.5 K/UL (ref 0.2–0.8)
MONOCYTES NFR BLD: 7 %
NEUTROPHILS # BLD: 5.2 K/UL (ref 1.4–6.5)
NEUTS SEG NFR BLD: 70.3 %
PLATELET # BLD AUTO: 108 K/UL (ref 130–400)
POTASSIUM SERPL-SCNC: 4.5 MEQ/L (ref 3.4–4.9)
RBC # BLD AUTO: 2.65 M/UL (ref 4.2–5.4)
SODIUM SERPL-SCNC: 137 MEQ/L (ref 135–144)
WBC # BLD AUTO: 7.3 K/UL (ref 4.8–10.8)

## 2024-06-28 PROCEDURE — 2580000003 HC RX 258: Performed by: NURSE PRACTITIONER

## 2024-06-28 PROCEDURE — 97162 PT EVAL MOD COMPLEX 30 MIN: CPT

## 2024-06-28 PROCEDURE — 6370000000 HC RX 637 (ALT 250 FOR IP): Performed by: SURGERY

## 2024-06-28 PROCEDURE — 99231 SBSQ HOSP IP/OBS SF/LOW 25: CPT | Performed by: NURSE PRACTITIONER

## 2024-06-28 PROCEDURE — 6370000000 HC RX 637 (ALT 250 FOR IP): Performed by: PHYSICIAN ASSISTANT

## 2024-06-28 PROCEDURE — 6360000002 HC RX W HCPCS: Performed by: NURSE PRACTITIONER

## 2024-06-28 PROCEDURE — 97535 SELF CARE MNGMENT TRAINING: CPT

## 2024-06-28 PROCEDURE — 85025 COMPLETE CBC W/AUTO DIFF WBC: CPT

## 2024-06-28 PROCEDURE — 1210000000 HC MED SURG R&B

## 2024-06-28 PROCEDURE — 97166 OT EVAL MOD COMPLEX 45 MIN: CPT

## 2024-06-28 PROCEDURE — 83735 ASSAY OF MAGNESIUM: CPT

## 2024-06-28 PROCEDURE — 6370000000 HC RX 637 (ALT 250 FOR IP): Performed by: NURSE PRACTITIONER

## 2024-06-28 PROCEDURE — 2700000000 HC OXYGEN THERAPY PER DAY

## 2024-06-28 PROCEDURE — 80048 BASIC METABOLIC PNL TOTAL CA: CPT

## 2024-06-28 PROCEDURE — 2580000003 HC RX 258: Performed by: PHYSICIAN ASSISTANT

## 2024-06-28 RX ORDER — OXYCODONE HYDROCHLORIDE 5 MG/1
5 TABLET ORAL EVERY 4 HOURS PRN
Status: DISCONTINUED | OUTPATIENT
Start: 2024-06-28 | End: 2024-07-01 | Stop reason: HOSPADM

## 2024-06-28 RX ORDER — OXYCODONE HYDROCHLORIDE 5 MG/1
2.5 TABLET ORAL EVERY 4 HOURS PRN
Status: DISCONTINUED | OUTPATIENT
Start: 2024-06-28 | End: 2024-07-01 | Stop reason: HOSPADM

## 2024-06-28 RX ADMIN — ATORVASTATIN CALCIUM 40 MG: 40 TABLET, FILM COATED ORAL at 21:27

## 2024-06-28 RX ADMIN — METHOCARBAMOL 500 MG: 500 TABLET ORAL at 13:26

## 2024-06-28 RX ADMIN — SODIUM CHLORIDE, POTASSIUM CHLORIDE, SODIUM LACTATE AND CALCIUM CHLORIDE: 600; 310; 30; 20 INJECTION, SOLUTION INTRAVENOUS at 06:09

## 2024-06-28 RX ADMIN — OXYCODONE HYDROCHLORIDE 5 MG: 5 TABLET ORAL at 13:28

## 2024-06-28 RX ADMIN — METHOCARBAMOL 500 MG: 500 TABLET ORAL at 17:16

## 2024-06-28 RX ADMIN — ACETAMINOPHEN 325MG 650 MG: 325 TABLET ORAL at 17:16

## 2024-06-28 RX ADMIN — METHOCARBAMOL 500 MG: 500 TABLET ORAL at 21:32

## 2024-06-28 RX ADMIN — METOPROLOL TARTRATE 50 MG: 50 TABLET, FILM COATED ORAL at 21:27

## 2024-06-28 RX ADMIN — SENNOSIDES AND DOCUSATE SODIUM 1 TABLET: 50; 8.6 TABLET ORAL at 21:27

## 2024-06-28 RX ADMIN — METHOCARBAMOL 500 MG: 500 TABLET ORAL at 09:57

## 2024-06-28 RX ADMIN — ACETAMINOPHEN 325MG 650 MG: 325 TABLET ORAL at 06:10

## 2024-06-28 RX ADMIN — ACETAMINOPHEN 325MG 650 MG: 325 TABLET ORAL at 21:32

## 2024-06-28 RX ADMIN — AMLODIPINE BESYLATE 5 MG: 5 TABLET ORAL at 09:57

## 2024-06-28 RX ADMIN — METOPROLOL TARTRATE 50 MG: 50 TABLET, FILM COATED ORAL at 09:57

## 2024-06-28 RX ADMIN — SENNOSIDES AND DOCUSATE SODIUM 1 TABLET: 50; 8.6 TABLET ORAL at 09:57

## 2024-06-28 RX ADMIN — ACETAMINOPHEN 325MG 650 MG: 325 TABLET ORAL at 09:56

## 2024-06-28 RX ADMIN — ENOXAPARIN SODIUM 30 MG: 100 INJECTION SUBCUTANEOUS at 21:33

## 2024-06-28 RX ADMIN — ENOXAPARIN SODIUM 30 MG: 100 INJECTION SUBCUTANEOUS at 09:56

## 2024-06-28 RX ADMIN — CEFAZOLIN 2000 MG: 2 INJECTION, POWDER, FOR SOLUTION INTRAMUSCULAR; INTRAVENOUS at 06:14

## 2024-06-28 ASSESSMENT — PAIN DESCRIPTION - DESCRIPTORS
DESCRIPTORS: ACHING;BURNING
DESCRIPTORS: ACHING
DESCRIPTORS: ACHING;BURNING
DESCRIPTORS: DISCOMFORT
DESCRIPTORS: ACHING
DESCRIPTORS: ACHING

## 2024-06-28 ASSESSMENT — PAIN DESCRIPTION - ORIENTATION
ORIENTATION: LEFT

## 2024-06-28 ASSESSMENT — PAIN SCALES - GENERAL
PAINLEVEL_OUTOF10: 4
PAINLEVEL_OUTOF10: 8
PAINLEVEL_OUTOF10: 8
PAINLEVEL_OUTOF10: 0
PAINLEVEL_OUTOF10: 6
PAINLEVEL_OUTOF10: 8

## 2024-06-28 ASSESSMENT — PAIN DESCRIPTION - LOCATION
LOCATION: HIP

## 2024-06-28 NOTE — DISCHARGE INSTRUCTIONS
requires, call your physician for authorization to be seen in a specialty clinic.     If you do not have a primary physician please call [phone] for guidance on finding a [facility] provider.     If you have questions or concerns, if your condition worsens or you  develop new symptoms please call the Trauma Care Line at [phone].    ---------------------------------------------------------------------------------------------------------------------

## 2024-06-28 NOTE — CARE COORDINATION
REFERRAL WAS SENT TO PT'S FIRST CHOICE FOR SNF IN CASE REHAB IS NOT APPROPRIATE FOR HER AT [facility].  REFERRAL SENT TO [facility].

## 2024-06-28 NOTE — DISCHARGE INSTR - COC
{[facility] Vent List:[id number]}    Rehab Therapies: {THERAPEUTIC INTERVENTION:[id number]}  Weight Bearing Status/Restrictions: {[facility] Weight Bearin}  Other Medical Equipment (for information only, NOT a DME order):  {EQUIPMENT:[id number]}  Other Treatments: ***    Patient's personal belongings (please select all that are sent with patient):  {[facility] DME Belongings:[id number]}    RN SIGNATURE:  {Esignature:[id number]}    CASE MANAGEMENT/SOCIAL WORK SECTION    Inpatient Status Date: 24    Readmission Risk Assessment Score:  Readmission Risk              Risk of Unplanned Readmission:  13           Discharging to Facility/ Agency   Name: [facility]  Address:  Phone:  Fax:    Dialysis Facility (if applicable)   Name:  Address:  Dialysis Schedule:  Phone:  Fax:    / signature: Electronically signed by BRENDA Rodriguez on 24 at 10:17 AM EDT    PHYSICIAN SECTION    Prognosis: Good    Condition at Discharge: Stable    Rehab Potential (if transferring to Rehab): Good    Recommended Labs or Other Treatments After Discharge:   Follow up with Dr. Napier in orthopedics in 2 weeks  OK to remove aquacel dressing on 2024  WBAT left leg    Physician Certification: I certify the above information and transfer of Jeannine Moncada  is necessary for the continuing treatment of the diagnosis listed and that she requires Skilled Nursing Facility for less 30 days.     Update Admission H&P: No change in H&P    PHYSICIAN SIGNATURE:  Electronically signed by José Manuel Waldron PA-C on 24 at 10:54 AM EDT

## 2024-06-29 PROBLEM — D62 ACUTE BLOOD LOSS ANEMIA: Status: ACTIVE | Noted: 2024-06-29

## 2024-06-29 PROBLEM — N17.9 AKI (ACUTE KIDNEY INJURY) (HCC): Status: ACTIVE | Noted: 2024-06-29

## 2024-06-29 PROBLEM — G89.18 ACUTE POST-OPERATIVE PAIN: Status: ACTIVE | Noted: 2024-06-29

## 2024-06-29 LAB
ACANTHOCYTES BLD QL SMEAR: ABNORMAL
ANION GAP SERPL CALCULATED.3IONS-SCNC: 10 MEQ/L (ref 9–15)
BASOPHILS # BLD: 0 K/UL (ref 0–0.2)
BASOPHILS NFR BLD: 0.1 %
BUN SERPL-MCNC: 25 MG/DL (ref 8–23)
CALCIUM SERPL-MCNC: 8.1 MG/DL (ref 8.5–9.9)
CHLORIDE SERPL-SCNC: 103 MEQ/L (ref 95–107)
CO2 SERPL-SCNC: 22 MEQ/L (ref 20–31)
CREAT SERPL-MCNC: 1.13 MG/DL (ref 0.5–0.9)
EOSINOPHIL # BLD: 0.1 K/UL (ref 0–0.7)
EOSINOPHIL NFR BLD: 1 %
ERYTHROCYTE [DISTWIDTH] IN BLOOD BY AUTOMATED COUNT: 13.6 % (ref 11.5–14.5)
ERYTHROCYTE [DISTWIDTH] IN BLOOD BY AUTOMATED COUNT: 13.7 % (ref 11.5–14.5)
GLUCOSE SERPL-MCNC: 106 MG/DL (ref 70–99)
HCT VFR BLD AUTO: 19.1 % (ref 37–47)
HCT VFR BLD AUTO: 20.9 % (ref 37–47)
HGB BLD-MCNC: 6.3 G/DL (ref 12–16)
HGB BLD-MCNC: 7 G/DL (ref 12–16)
LYMPHOCYTES # BLD: 1 K/UL (ref 1–4.8)
LYMPHOCYTES NFR BLD: 13 %
MAGNESIUM SERPL-MCNC: 2 MG/DL (ref 1.7–2.4)
MCH RBC QN AUTO: 29.7 PG (ref 27–31.3)
MCH RBC QN AUTO: 30.3 PG (ref 27–31.3)
MCHC RBC AUTO-ENTMCNC: 33 % (ref 33–37)
MCHC RBC AUTO-ENTMCNC: 33.5 % (ref 33–37)
MCV RBC AUTO: 90.1 FL (ref 79.4–94.8)
MCV RBC AUTO: 90.5 FL (ref 79.4–94.8)
MONOCYTES # BLD: 0.5 K/UL (ref 0.2–0.8)
MONOCYTES NFR BLD: 6.7 %
NEUTROPHILS # BLD: 5.9 K/UL (ref 1.4–6.5)
NEUTS BAND NFR BLD MANUAL: 1 % (ref 5–11)
NEUTS SEG NFR BLD: 78 %
PLATELET # BLD AUTO: 96 K/UL (ref 130–400)
PLATELET # BLD AUTO: 97 K/UL (ref 130–400)
PLATELET BLD QL SMEAR: ABNORMAL
POIKILOCYTOSIS BLD QL SMEAR: ABNORMAL
POTASSIUM SERPL-SCNC: 4.4 MEQ/L (ref 3.4–4.9)
RBC # BLD AUTO: 2.12 M/UL (ref 4.2–5.4)
RBC # BLD AUTO: 2.31 M/UL (ref 4.2–5.4)
REASON FOR REJECTION: NORMAL
REJECTED TEST: NORMAL
SLIDE REVIEW: ABNORMAL
SMUDGE CELLS BLD QL SMEAR: 16.2
SODIUM SERPL-SCNC: 135 MEQ/L (ref 135–144)
WBC # BLD AUTO: 7.2 K/UL (ref 4.8–10.8)
WBC # BLD AUTO: 7.5 K/UL (ref 4.8–10.8)

## 2024-06-29 PROCEDURE — 1210000000 HC MED SURG R&B

## 2024-06-29 PROCEDURE — 97535 SELF CARE MNGMENT TRAINING: CPT

## 2024-06-29 PROCEDURE — 85027 COMPLETE CBC AUTOMATED: CPT

## 2024-06-29 PROCEDURE — 36430 TRANSFUSION BLD/BLD COMPNT: CPT

## 2024-06-29 PROCEDURE — 6370000000 HC RX 637 (ALT 250 FOR IP): Performed by: PHYSICIAN ASSISTANT

## 2024-06-29 PROCEDURE — 6370000000 HC RX 637 (ALT 250 FOR IP): Performed by: NURSE PRACTITIONER

## 2024-06-29 PROCEDURE — 6370000000 HC RX 637 (ALT 250 FOR IP): Performed by: SURGERY

## 2024-06-29 PROCEDURE — 83735 ASSAY OF MAGNESIUM: CPT

## 2024-06-29 PROCEDURE — 80048 BASIC METABOLIC PNL TOTAL CA: CPT

## 2024-06-29 PROCEDURE — 36415 COLL VENOUS BLD VENIPUNCTURE: CPT

## 2024-06-29 PROCEDURE — 6360000002 HC RX W HCPCS: Performed by: NURSE PRACTITIONER

## 2024-06-29 PROCEDURE — 2580000003 HC RX 258: Performed by: SURGERY

## 2024-06-29 PROCEDURE — 85025 COMPLETE CBC W/AUTO DIFF WBC: CPT

## 2024-06-29 RX ORDER — SODIUM CHLORIDE 9 MG/ML
INJECTION, SOLUTION INTRAVENOUS PRN
Status: DISCONTINUED | OUTPATIENT
Start: 2024-06-29 | End: 2024-07-01

## 2024-06-29 RX ORDER — LISINOPRIL 20 MG/1
20 TABLET ORAL DAILY
Status: DISCONTINUED | OUTPATIENT
Start: 2024-06-30 | End: 2024-07-01 | Stop reason: HOSPADM

## 2024-06-29 RX ADMIN — METOPROLOL TARTRATE 50 MG: 50 TABLET, FILM COATED ORAL at 09:19

## 2024-06-29 RX ADMIN — ASPIRIN 81 MG 81 MG: 81 TABLET ORAL at 09:19

## 2024-06-29 RX ADMIN — ACETAMINOPHEN 325MG 650 MG: 325 TABLET ORAL at 22:10

## 2024-06-29 RX ADMIN — METHOCARBAMOL 500 MG: 500 TABLET ORAL at 13:21

## 2024-06-29 RX ADMIN — ENOXAPARIN SODIUM 30 MG: 100 INJECTION SUBCUTANEOUS at 22:09

## 2024-06-29 RX ADMIN — SENNOSIDES AND DOCUSATE SODIUM 1 TABLET: 50; 8.6 TABLET ORAL at 22:09

## 2024-06-29 RX ADMIN — ATORVASTATIN CALCIUM 40 MG: 40 TABLET, FILM COATED ORAL at 22:09

## 2024-06-29 RX ADMIN — AMLODIPINE BESYLATE 5 MG: 5 TABLET ORAL at 09:19

## 2024-06-29 RX ADMIN — Medication 10 ML: at 22:10

## 2024-06-29 RX ADMIN — SENNOSIDES AND DOCUSATE SODIUM 1 TABLET: 50; 8.6 TABLET ORAL at 09:19

## 2024-06-29 RX ADMIN — OXYCODONE HYDROCHLORIDE 5 MG: 5 TABLET ORAL at 16:58

## 2024-06-29 RX ADMIN — METOPROLOL TARTRATE 50 MG: 50 TABLET, FILM COATED ORAL at 22:10

## 2024-06-29 RX ADMIN — METHOCARBAMOL 500 MG: 500 TABLET ORAL at 22:09

## 2024-06-29 RX ADMIN — ACETAMINOPHEN 325MG 650 MG: 325 TABLET ORAL at 09:18

## 2024-06-29 RX ADMIN — METHOCARBAMOL 500 MG: 500 TABLET ORAL at 09:19

## 2024-06-29 ASSESSMENT — PAIN DESCRIPTION - DESCRIPTORS: DESCRIPTORS: ACHING

## 2024-06-29 ASSESSMENT — PAIN DESCRIPTION - LOCATION: LOCATION: HIP

## 2024-06-29 ASSESSMENT — PAIN SCALES - GENERAL
PAINLEVEL_OUTOF10: 2
PAINLEVEL_OUTOF10: 7

## 2024-06-29 NOTE — CONSENT
Informed Consent for Blood Component Transfusion Note    I have discussed with the patient the rationale for blood component transfusion; its benefits in treating or preventing fatigue, organ damage, or death; and its risk which includes mild transfusion reactions, rare risk of blood borne infection, or more serious but rare reactions. I have discussed the alternatives to transfusion, including the risk and consequences of not receiving transfusion. The patient had an opportunity to ask questions and had agreed to proceed with transfusion of blood components.    Electronically signed by José Manuel Waldron PA-C on 6/29/24 at 4:39 PM EDT

## 2024-06-29 NOTE — CARE COORDINATION
[facility]. PT HAS HAD 3 OVERNIGHTS. AWAITING CALL BACK.

## 2024-06-29 NOTE — CARE COORDINATION
Spoke with pt son, Constantine. DC plan remains [facility].   Spoke with Sarah [facility], pt accepted. Will not have bed until Monday.

## 2024-06-30 LAB
ANION GAP SERPL CALCULATED.3IONS-SCNC: 7 MEQ/L (ref 9–15)
BASOPHILS # BLD: 0 K/UL (ref 0–0.2)
BASOPHILS NFR BLD: 0.3 %
BLOOD BANK DISPENSE STATUS: NORMAL
BLOOD BANK DISPENSE STATUS: NORMAL
BLOOD BANK PRODUCT CODE: NORMAL
BLOOD BANK PRODUCT CODE: NORMAL
BPU ID: NORMAL
BPU ID: NORMAL
BUN SERPL-MCNC: 20 MG/DL (ref 8–23)
CALCIUM SERPL-MCNC: 7.8 MG/DL (ref 8.5–9.9)
CHLORIDE SERPL-SCNC: 108 MEQ/L (ref 95–107)
CO2 SERPL-SCNC: 25 MEQ/L (ref 20–31)
CREAT SERPL-MCNC: 0.93 MG/DL (ref 0.5–0.9)
DESCRIPTION BLOOD BANK: NORMAL
DESCRIPTION BLOOD BANK: NORMAL
EOSINOPHIL # BLD: 0.2 K/UL (ref 0–0.7)
EOSINOPHIL NFR BLD: 2.3 %
ERYTHROCYTE [DISTWIDTH] IN BLOOD BY AUTOMATED COUNT: 14.2 % (ref 11.5–14.5)
GLUCOSE SERPL-MCNC: 111 MG/DL (ref 70–99)
HCT VFR BLD AUTO: 20.9 % (ref 37–47)
HCT VFR BLD AUTO: 27.7 % (ref 37–47)
HGB BLD-MCNC: 6.9 G/DL (ref 12–16)
HGB BLD-MCNC: 9.3 G/DL (ref 12–16)
LYMPHOCYTES # BLD: 2 K/UL (ref 1–4.8)
LYMPHOCYTES NFR BLD: 30.9 %
MAGNESIUM SERPL-MCNC: 2 MG/DL (ref 1.7–2.4)
MCH RBC QN AUTO: 29.6 PG (ref 27–31.3)
MCHC RBC AUTO-ENTMCNC: 33 % (ref 33–37)
MCV RBC AUTO: 89.7 FL (ref 79.4–94.8)
MONOCYTES # BLD: 0.7 K/UL (ref 0.2–0.8)
MONOCYTES NFR BLD: 10.6 %
NEUTROPHILS # BLD: 3.7 K/UL (ref 1.4–6.5)
NEUTS SEG NFR BLD: 55.3 %
PLATELET # BLD AUTO: 90 K/UL (ref 130–400)
POTASSIUM SERPL-SCNC: 4.1 MEQ/L (ref 3.4–4.9)
RBC # BLD AUTO: 2.33 M/UL (ref 4.2–5.4)
SODIUM SERPL-SCNC: 140 MEQ/L (ref 135–144)
WBC # BLD AUTO: 6.6 K/UL (ref 4.8–10.8)

## 2024-06-30 PROCEDURE — 83735 ASSAY OF MAGNESIUM: CPT

## 2024-06-30 PROCEDURE — 97110 THERAPEUTIC EXERCISES: CPT

## 2024-06-30 PROCEDURE — 2580000003 HC RX 258

## 2024-06-30 PROCEDURE — 85014 HEMATOCRIT: CPT

## 2024-06-30 PROCEDURE — 6370000000 HC RX 637 (ALT 250 FOR IP): Performed by: NURSE PRACTITIONER

## 2024-06-30 PROCEDURE — 6370000000 HC RX 637 (ALT 250 FOR IP): Performed by: STUDENT IN AN ORGANIZED HEALTH CARE EDUCATION/TRAINING PROGRAM

## 2024-06-30 PROCEDURE — 85018 HEMOGLOBIN: CPT

## 2024-06-30 PROCEDURE — 2580000003 HC RX 258: Performed by: SURGERY

## 2024-06-30 PROCEDURE — 80048 BASIC METABOLIC PNL TOTAL CA: CPT

## 2024-06-30 PROCEDURE — 6370000000 HC RX 637 (ALT 250 FOR IP): Performed by: SURGERY

## 2024-06-30 PROCEDURE — 6360000002 HC RX W HCPCS: Performed by: NURSE PRACTITIONER

## 2024-06-30 PROCEDURE — 1210000000 HC MED SURG R&B

## 2024-06-30 PROCEDURE — 6370000000 HC RX 637 (ALT 250 FOR IP): Performed by: PHYSICIAN ASSISTANT

## 2024-06-30 PROCEDURE — 97535 SELF CARE MNGMENT TRAINING: CPT

## 2024-06-30 PROCEDURE — 85025 COMPLETE CBC W/AUTO DIFF WBC: CPT

## 2024-06-30 PROCEDURE — 36430 TRANSFUSION BLD/BLD COMPNT: CPT

## 2024-06-30 PROCEDURE — 36415 COLL VENOUS BLD VENIPUNCTURE: CPT

## 2024-06-30 RX ORDER — SODIUM CHLORIDE 9 MG/ML
INJECTION, SOLUTION INTRAVENOUS PRN
Status: DISCONTINUED | OUTPATIENT
Start: 2024-06-30 | End: 2024-07-01

## 2024-06-30 RX ORDER — POLYETHYLENE GLYCOL 3350 17 G/17G
17 POWDER, FOR SOLUTION ORAL DAILY
Status: DISCONTINUED | OUTPATIENT
Start: 2024-06-30 | End: 2024-07-01 | Stop reason: HOSPADM

## 2024-06-30 RX ORDER — SODIUM CHLORIDE 9 MG/ML
INJECTION, SOLUTION INTRAVENOUS
Status: COMPLETED
Start: 2024-06-30 | End: 2024-06-30

## 2024-06-30 RX ADMIN — SENNOSIDES AND DOCUSATE SODIUM 1 TABLET: 50; 8.6 TABLET ORAL at 09:54

## 2024-06-30 RX ADMIN — ACETAMINOPHEN 325MG 650 MG: 325 TABLET ORAL at 16:37

## 2024-06-30 RX ADMIN — METHOCARBAMOL 500 MG: 500 TABLET ORAL at 13:44

## 2024-06-30 RX ADMIN — LISINOPRIL 20 MG: 20 TABLET ORAL at 09:54

## 2024-06-30 RX ADMIN — AMLODIPINE BESYLATE 5 MG: 5 TABLET ORAL at 09:54

## 2024-06-30 RX ADMIN — ATORVASTATIN CALCIUM 40 MG: 40 TABLET, FILM COATED ORAL at 21:05

## 2024-06-30 RX ADMIN — POLYETHYLENE GLYCOL 3350 17 G: 17 POWDER, FOR SOLUTION ORAL at 13:45

## 2024-06-30 RX ADMIN — METOPROLOL TARTRATE 50 MG: 50 TABLET, FILM COATED ORAL at 21:06

## 2024-06-30 RX ADMIN — METHOCARBAMOL 500 MG: 500 TABLET ORAL at 09:54

## 2024-06-30 RX ADMIN — ACETAMINOPHEN 325MG 650 MG: 325 TABLET ORAL at 04:53

## 2024-06-30 RX ADMIN — METHOCARBAMOL 500 MG: 500 TABLET ORAL at 16:37

## 2024-06-30 RX ADMIN — ENOXAPARIN SODIUM 30 MG: 100 INJECTION SUBCUTANEOUS at 09:53

## 2024-06-30 RX ADMIN — ENOXAPARIN SODIUM 30 MG: 100 INJECTION SUBCUTANEOUS at 21:06

## 2024-06-30 RX ADMIN — ACETAMINOPHEN 325MG 650 MG: 325 TABLET ORAL at 21:05

## 2024-06-30 RX ADMIN — Medication 10 ML: at 09:57

## 2024-06-30 RX ADMIN — ACETAMINOPHEN 325MG 650 MG: 325 TABLET ORAL at 09:54

## 2024-06-30 RX ADMIN — Medication 10 ML: at 21:06

## 2024-06-30 RX ADMIN — ASPIRIN 81 MG 81 MG: 81 TABLET ORAL at 09:54

## 2024-06-30 RX ADMIN — SENNOSIDES AND DOCUSATE SODIUM 1 TABLET: 50; 8.6 TABLET ORAL at 21:06

## 2024-06-30 RX ADMIN — SODIUM CHLORIDE 500 ML: 9 INJECTION, SOLUTION INTRAVENOUS at 09:56

## 2024-06-30 RX ADMIN — OXYCODONE HYDROCHLORIDE 5 MG: 5 TABLET ORAL at 03:00

## 2024-06-30 RX ADMIN — METHOCARBAMOL 500 MG: 500 TABLET ORAL at 21:05

## 2024-06-30 RX ADMIN — METOPROLOL TARTRATE 50 MG: 50 TABLET, FILM COATED ORAL at 09:55

## 2024-06-30 ASSESSMENT — PAIN SCALES - GENERAL
PAINLEVEL_OUTOF10: 8
PAINLEVEL_OUTOF10: 7

## 2024-06-30 ASSESSMENT — PAIN DESCRIPTION - LOCATION: LOCATION: HAND;FOOT

## 2024-06-30 NOTE — FLOWSHEET NOTE
1900-  Assumed care of this pt at this time. Pt resting in bed, no acute distress noted, call light within reach.     2000-  PM assessment complete. Pt resting in bed, no acute distress noted, call light remains within reach.     2100-  PM medications given. All questions/ concerns addressed.     2300-  One unit B+ retreive and administered at this time.     0200-  Pt resting in bed, no acute distress noted, call light remains within reach.       0600-  H/H 6.9/20.9 at this time.  made aware via perfect serve. No new orders received at this time. Pt resting in bed, no acute distress noted, call light remains within reach.     Electronically signed by [place] RN on 6/30/2024 at 6:11 AM

## 2024-07-01 VITALS
RESPIRATION RATE: 18 BRPM | BODY MASS INDEX: 25.54 KG/M2 | SYSTOLIC BLOOD PRESSURE: 155 MMHG | TEMPERATURE: 97.9 F | HEART RATE: 70 BPM | WEIGHT: 148.8 LBS | OXYGEN SATURATION: 100 % | DIASTOLIC BLOOD PRESSURE: 66 MMHG

## 2024-07-01 LAB
ANION GAP SERPL CALCULATED.3IONS-SCNC: 8 MEQ/L (ref 9–15)
BUN SERPL-MCNC: 14 MG/DL (ref 8–23)
CALCIUM SERPL-MCNC: 8.3 MG/DL (ref 8.5–9.9)
CHLORIDE SERPL-SCNC: 109 MEQ/L (ref 95–107)
CO2 SERPL-SCNC: 25 MEQ/L (ref 20–31)
CREAT SERPL-MCNC: 0.73 MG/DL (ref 0.5–0.9)
ERYTHROCYTE [DISTWIDTH] IN BLOOD BY AUTOMATED COUNT: 15.6 % (ref 11.5–14.5)
GLUCOSE SERPL-MCNC: 125 MG/DL (ref 70–99)
HCT VFR BLD AUTO: 24.7 % (ref 37–47)
HGB BLD-MCNC: 8.3 G/DL (ref 12–16)
MCH RBC QN AUTO: 29.5 PG (ref 27–31.3)
MCHC RBC AUTO-ENTMCNC: 33.6 % (ref 33–37)
MCV RBC AUTO: 87.9 FL (ref 79.4–94.8)
PLATELET # BLD AUTO: 122 K/UL (ref 130–400)
POTASSIUM SERPL-SCNC: 4.1 MEQ/L (ref 3.4–4.9)
RBC # BLD AUTO: 2.81 M/UL (ref 4.2–5.4)
SODIUM SERPL-SCNC: 142 MEQ/L (ref 135–144)
WBC # BLD AUTO: 5.3 K/UL (ref 4.8–10.8)

## 2024-07-01 PROCEDURE — 36415 COLL VENOUS BLD VENIPUNCTURE: CPT

## 2024-07-01 PROCEDURE — 6370000000 HC RX 637 (ALT 250 FOR IP): Performed by: SURGERY

## 2024-07-01 PROCEDURE — 97535 SELF CARE MNGMENT TRAINING: CPT

## 2024-07-01 PROCEDURE — 6370000000 HC RX 637 (ALT 250 FOR IP): Performed by: STUDENT IN AN ORGANIZED HEALTH CARE EDUCATION/TRAINING PROGRAM

## 2024-07-01 PROCEDURE — 97116 GAIT TRAINING THERAPY: CPT

## 2024-07-01 PROCEDURE — 80048 BASIC METABOLIC PNL TOTAL CA: CPT

## 2024-07-01 PROCEDURE — 97110 THERAPEUTIC EXERCISES: CPT

## 2024-07-01 PROCEDURE — 6370000000 HC RX 637 (ALT 250 FOR IP): Performed by: PHYSICIAN ASSISTANT

## 2024-07-01 PROCEDURE — 85027 COMPLETE CBC AUTOMATED: CPT

## 2024-07-01 PROCEDURE — 2580000003 HC RX 258: Performed by: SURGERY

## 2024-07-01 PROCEDURE — 6360000002 HC RX W HCPCS: Performed by: NURSE PRACTITIONER

## 2024-07-01 RX ORDER — ASPIRIN 81 MG/1
81 TABLET, CHEWABLE ORAL 2 TIMES DAILY
Qty: 84 TABLET | Refills: 0 | Status: SHIPPED | OUTPATIENT
Start: 2024-07-01 | End: 2024-08-12

## 2024-07-01 RX ORDER — METHOCARBAMOL 500 MG/1
500 TABLET, FILM COATED ORAL 4 TIMES DAILY
Qty: 40 TABLET | Refills: 0 | Status: SHIPPED | OUTPATIENT
Start: 2024-07-01 | End: 2024-07-11

## 2024-07-01 RX ORDER — OXYCODONE HYDROCHLORIDE 5 MG/1
5 TABLET ORAL EVERY 6 HOURS PRN
Qty: 20 TABLET | Refills: 0 | Status: SHIPPED | OUTPATIENT
Start: 2024-07-01 | End: 2024-07-06

## 2024-07-01 RX ADMIN — ACETAMINOPHEN 325MG 650 MG: 325 TABLET ORAL at 17:07

## 2024-07-01 RX ADMIN — METHOCARBAMOL 500 MG: 500 TABLET ORAL at 19:49

## 2024-07-01 RX ADMIN — METOPROLOL TARTRATE 50 MG: 50 TABLET, FILM COATED ORAL at 19:50

## 2024-07-01 RX ADMIN — METHOCARBAMOL 500 MG: 500 TABLET ORAL at 17:07

## 2024-07-01 RX ADMIN — Medication 10 ML: at 09:14

## 2024-07-01 RX ADMIN — POLYETHYLENE GLYCOL 3350 17 G: 17 POWDER, FOR SOLUTION ORAL at 09:13

## 2024-07-01 RX ADMIN — ASPIRIN 81 MG 81 MG: 81 TABLET ORAL at 09:13

## 2024-07-01 RX ADMIN — METHOCARBAMOL 500 MG: 500 TABLET ORAL at 09:13

## 2024-07-01 RX ADMIN — AMLODIPINE BESYLATE 5 MG: 5 TABLET ORAL at 09:13

## 2024-07-01 RX ADMIN — ENOXAPARIN SODIUM 30 MG: 100 INJECTION SUBCUTANEOUS at 09:12

## 2024-07-01 RX ADMIN — ACETAMINOPHEN 325MG 650 MG: 325 TABLET ORAL at 09:12

## 2024-07-01 RX ADMIN — ATORVASTATIN CALCIUM 40 MG: 40 TABLET, FILM COATED ORAL at 19:49

## 2024-07-01 RX ADMIN — Medication 10 ML: at 19:50

## 2024-07-01 RX ADMIN — METHOCARBAMOL 500 MG: 500 TABLET ORAL at 13:04

## 2024-07-01 RX ADMIN — METOPROLOL TARTRATE 50 MG: 50 TABLET, FILM COATED ORAL at 09:13

## 2024-07-01 RX ADMIN — SENNOSIDES AND DOCUSATE SODIUM 1 TABLET: 50; 8.6 TABLET ORAL at 09:13

## 2024-07-01 RX ADMIN — ACETAMINOPHEN 325MG 650 MG: 325 TABLET ORAL at 03:21

## 2024-07-01 RX ADMIN — LISINOPRIL 20 MG: 20 TABLET ORAL at 09:13

## 2024-07-01 ASSESSMENT — PAIN SCALES - GENERAL
PAINLEVEL_OUTOF10: 4
PAINLEVEL_OUTOF10: 6
PAINLEVEL_OUTOF10: 0

## 2024-07-01 ASSESSMENT — PAIN DESCRIPTION - LOCATION: LOCATION: INCISION

## 2024-07-01 ASSESSMENT — PAIN DESCRIPTION - DESCRIPTORS: DESCRIPTORS: ACHING

## 2024-07-01 ASSESSMENT — PAIN DESCRIPTION - ORIENTATION: ORIENTATION: LEFT

## 2024-07-01 NOTE — CARE COORDINATION
Met with pt and son Constantine at bedside. Reviewed pg 2 of IMM and pt signed consent, copy provided. Dc plan to [facility] with 6pm  today.

## 2024-07-01 NOTE — CARE COORDINATION
PT IS MEDICALLY CLEARED TO TRANSFER TO [facility] TODAY.  [facility] .

## 2024-07-01 NOTE — PLAN OF CARE
Problem: Pain  Goal: Verbalizes/displays adequate comfort level or baseline comfort level  6/30/2024 1633 by Reina Ruggiero RN  Outcome: Progressing  6/30/2024 0331 by Sang Lake RN  Outcome: Progressing     Problem: Discharge Planning  Goal: Discharge to home or other facility with appropriate resources  6/30/2024 1633 by Reina Ruggiero RN  Outcome: Progressing  6/30/2024 0331 by Sang Lake RN  Outcome: Progressing     Problem: Safety - Adult  Goal: Free from fall injury  6/30/2024 1633 by Reina Ruggiero RN  Outcome: Progressing  6/30/2024 0331 by Sang Lake RN  Outcome: Progressing     Problem: ABCDS Injury Assessment  Goal: Absence of physical injury  6/30/2024 1633 by Reina Ruggiero RN  Outcome: Progressing  6/30/2024 0331 by Sang Lake RN  Outcome: Progressing     Problem: Skin/Tissue Integrity  Goal: Absence of new skin breakdown  Description: 1.  Monitor for areas of redness and/or skin breakdown  2.  Assess vascular access sites hourly  3.  Every 4-6 hours minimum:  Change oxygen saturation probe site  4.  Every 4-6 hours:  If on nasal continuous positive airway pressure, respiratory therapy assess nares and determine need for appliance change or resting period.  6/30/2024 1633 by Reina Ruggiero RN  Outcome: Progressing  6/30/2024 0331 by Sang Lake RN  Outcome: Progressing     
  Problem: Pain  Goal: Verbalizes/displays adequate comfort level or baseline comfort level  Outcome: Adequate for Discharge     Problem: Discharge Planning  Goal: Discharge to home or other facility with appropriate resources  Outcome: Adequate for Discharge     
  Problem: Pain  Goal: Verbalizes/displays adequate comfort level or baseline comfort level  Outcome: Progressing     Problem: Discharge Planning  Goal: Discharge to home or other facility with appropriate resources  Outcome: Progressing     Problem: Safety - Adult  Goal: Free from fall injury  Outcome: Progressing     Problem: ABCDS Injury Assessment  Goal: Absence of physical injury  Outcome: Progressing     Problem: Skin/Tissue Integrity  Goal: Absence of new skin breakdown  Description: 1.  Monitor for areas of redness and/or skin breakdown  2.  Assess vascular access sites hourly  3.  Every 4-6 hours minimum:  Change oxygen saturation probe site  4.  Every 4-6 hours:  If on nasal continuous positive airway pressure, respiratory therapy assess nares and determine need for appliance change or resting period.  Outcome: Progressing     
  Problem: Pain  Goal: Verbalizes/displays adequate comfort level or baseline comfort level  Outcome: Progressing  Flowsheets (Taken 6/28/2024 1948)  Verbalizes/displays adequate comfort level or baseline comfort level:   Encourage patient to monitor pain and request assistance   Assess pain using appropriate pain scale   Administer analgesics based on type and severity of pain and evaluate response   Consider cultural and social influences on pain and pain management     Problem: Discharge Planning  Goal: Discharge to home or other facility with appropriate resources  Outcome: Progressing  Flowsheets (Taken 6/28/2024 2015)  Discharge to home or other facility with appropriate resources:   Identify barriers to discharge with patient and caregiver   Identify discharge learning needs (meds, wound care, etc)   Arrange for needed discharge resources and transportation as appropriate   Refer to discharge planning if patient needs post-hospital services based on physician order or complex needs related to functional status, cognitive ability or social support system     Problem: Safety - Adult  Goal: Free from fall injury  Outcome: Progressing     Problem: ABCDS Injury Assessment  Goal: Absence of physical injury  Outcome: Progressing     Problem: Skin/Tissue Integrity  Goal: Absence of new skin breakdown  Description: 1.  Monitor for areas of redness and/or skin breakdown  2.  Assess vascular access sites hourly  3.  Every 4-6 hours minimum:  Change oxygen saturation probe site  4.  Every 4-6 hours:  If on nasal continuous positive airway pressure, respiratory therapy assess nares and determine need for appliance change or resting period.  Outcome: Progressing     Problem: Occupational Therapy - Adult  Goal: By Discharge: Performs self-care activities at highest level of function for planned discharge setting.  See evaluation for individualized goals.  6/28/2024 1216 by Sharmin Wills, OTR/L  Outcome: Progressing   
See OT evaluation for all goals and OT POC. Electronically signed by Sharmin Wills OTR/L on 6/28/2024 at 12:16 PM    
Therapy evaluation completed.  Please see daily notes and/or progress notes for details related to planned treatment interventions, goals and functional performance.    
respiratory therapy assess nares and determine need for appliance change or resting period.  6/29/2024 1037 by Natalee Whitley, RN  Outcome: Progressing  6/28/2024 2216 by Tim Juares, RN  Outcome: Progressing

## 2024-07-01 NOTE — DISCHARGE SUMMARY
DISCHARGE SUMMARY   [place] [address] Jeannine Moncada  MRN: [id number]  YOB: 1930  89 y.o.female      Attending  Sourav Christian MD ?   Date of Admission  6/26/2024 Date of Discharge  07/01/24        ?  DIAGNOSES:  Principal Problem:    Intertrochanteric fracture, closed, left, initial encounter ([facility])  Active Problems:    Fall from standing    Acute pain due to trauma    Acute post-operative pain    Acute blood loss anemia    DAVONTE (acute kidney injury) ([facility])  Resolved Problems:    * No resolved hospital problems. *    PROCEDURES:  6/27/2024: Left hip short cephalomedullary nail by Dr. Karthikeyan Milan  ?    DISCHARGE MEDICATIONS:   Current Discharge Medication List             Details   oxyCODONE (ROXICODONE) 5 MG immediate release tablet Take 1 tablet by mouth every 6 hours as needed for Pain for up to 5 days. Max Daily Amount: 20 mg  Qty: 20 tablet, Refills: 0    Comments: Reduce doses taken as pain becomes manageable  Associated Diagnoses: Acute post-operative pain; Acute pain due to trauma      methocarbamol (ROBAXIN) 500 MG tablet Take 1 tablet by mouth 4 times daily for 10 days  Qty: 40 tablet, Refills: 0                Details   aspirin 81 MG chewable tablet Take 1 tablet by mouth 2 times daily  Qty: 84 tablet, Refills: 0                Details   acetaminophen (TYLENOL) 325 MG tablet Take 2 tablets by mouth every 4 hours as needed      metoprolol tartrate (LOPRESSOR) 50 MG tablet Take 1 tablet by mouth 2 times daily      lisinopril (PRINIVIL;ZESTRIL) 20 MG tablet Take 1 tablet by mouth daily      atorvastatin (LIPITOR) 40 MG tablet Take 1 tablet by mouth nightly      amLODIPine (NORVASC) 5 MG tablet Take 1 tablet by mouth daily      senna-docusate (PERICOLACE) 8.6-50 MG per tablet Take 2 tablets by mouth 2 times daily as needed for Constipation              REASON FOR HOSPITALIZATION:  Jeannine Moncada is a 89 y.o. female with a PMHx of HTN, HLD who presented to

## 2024-07-02 ENCOUNTER — OFFICE VISIT (OUTPATIENT)
Dept: GERIATRIC MEDICINE | Age: 89
End: 2024-07-02

## 2024-07-02 DIAGNOSIS — R53.1 WEAKNESS: ICD-10-CM

## 2024-07-02 DIAGNOSIS — S72.142D CLOSED 2-PART INTERTROCHANTERIC FRACTURE OF LEFT FEMUR WITH ROUTINE HEALING, SUBSEQUENT ENCOUNTER: Primary | ICD-10-CM

## 2024-07-02 DIAGNOSIS — R26.81 UNSTEADINESS: ICD-10-CM

## 2024-07-02 DIAGNOSIS — I10 PRIMARY HYPERTENSION: ICD-10-CM

## 2024-07-05 NOTE — PROGRESS NOTES
Physician Progress Note      PATIENT:               CRISTEL NÚÑEZ  CSN #:                  [id number]  :                       9/15/1930  ADMIT DATE:       2024 6:55 PM  DISCH DATE:  RESPONDING  PROVIDER #:        Maurizio Yadav MD          QUERY TEXT:    Pt admitted with left proximal intertrochanteric femur fracture s/p left hip   short cephalomedullary nailing. Pt noted to have admission HGB 11.3 & HCT 35   with  HGB 8.1 & HCT 24.4. If possible, please document in the progress   notes and discharge summary if you are evaluating and/or treating any of the   following:    The medical record reflects the following:  Risk Factors:  left proximal intertrochanteric femur fracture s/p left hip   short cephalomedullary nailing  Clinical Indicators: Admission HGB 11.3 & HCT 35,  HGB 8.1 & HCT 24.4  Treatment: Ortho consult, intertrochanteric nailing, labs, monitoring  Options provided:  -- Acute blood loss anemia  -- Iron deficiency anemia  -- Dilutional anemia  -- Other - I will add my own diagnosis  -- Disagree - Not applicable / Not valid  -- Disagree - Clinically unable to determine / Unknown  -- Refer to Clinical Documentation Reviewer    PROVIDER RESPONSE TEXT:    This patient has acute blood loss anemia.    Query created by: Siri Bowling on 2024 10:16 AM      Electronically signed by:  Maurizio Yadav MD 2024 8:10 PM          
-Pt is alert and oriented times four. Pt is able to answer admission questions.   -Daughter in law called by his nurse to complete medication reconciliation.   -Swallow screen performed. No s/s of coughing or chocking. Pt tolerated med administration well.   -Pt's son requesting a call when surgery time is available in order to be here at the facility before she goes to surgery.  - pt NPO at midnight   - iv fluids started per md order    
[facility]  Occupational Therapy        NAME:  Jeannine Moncada  ROOM: W293/W293-01  :  9/15/1930  DATE: 2024    Attempted to see Jeannine Moncada at 0821 on this date for:   [x]  Initial Evaluation   []  Treatment       Pt is on schedule this AM for L femur fx fixation. Will require post op orders with activity and weight bearing status. Will hold OT eval at this time.    Electronically signed by KASH Romero on 24 at 8:21 AM EDT  
Dr. Dumont and UNRULY Georges notified of hemoglobin result of 7.0.   
Hip surgery    Progress Note    Subjective:     Post-Operative Day: 1 Status Post left TFN  Systemic or Specific Complaints:No Complaints    Objective:     CURRENT VITALS:  BP (!) 108/50   Pulse 63   Temp 98.2 °F (36.8 °C) (Oral)   Resp 18   Wt 67.5 kg (148 lb 12.8 oz)   SpO2 99%   BMI 25.54 kg/m²     General: alert, appears stated age, and cooperative   Wound: Wound clean and dry no evidence of infection.   Motion: Pt has not been up yet   DVT Exam: No evidence of DVT seen on physical exam.       NVI in lower extremity. Thigh swollen but soft. Moving foot and ankle.      Data Review  Recent Labs     06/26/24  1945 06/27/24  0501   WBC 4.7* 7.3   RBC 3.76* 3.20*   HGB 11.3* 9.5*   HCT 35.0* 29.5*   MCV 93.1 92.2   MCH 30.1 29.7   MCHC 32.3* 32.2*   RDW 13.7 13.7   * 125*       Assessment:     Status Post left TFN. Doing well postoperatively.     Plan:      1: Continues current post-op course :  2:  start Deep venous thrombosis prophylaxis   3:  start physical therapy  4:  Continue Pain Control        
Hip surgery    Progress Note    Subjective:     Post-Operative Day: 2 Status Post left TFN  Systemic or Specific Complaints:No Complaints    Objective:     CURRENT VITALS:  /61   Pulse 63   Temp 99.3 °F (37.4 °C) (Oral)   Resp 18   Wt 67.5 kg (148 lb 12.8 oz)   SpO2 100%   BMI 25.54 kg/m²     General: alert, appears stated age, and cooperative   Wound: Wound clean and dry no evidence of infection.   Motion: Pt has not been up yet   DVT Exam: No evidence of DVT seen on physical exam.       NVI in lower extremity. Thigh swollen but soft. Moving foot and ankle.      Data Review  Recent Labs     06/26/24  1945 06/27/24  0501 06/28/24  0922   WBC 4.7* 7.3 7.3   RBC 3.76* 3.20* 2.65*   HGB 11.3* 9.5* 8.1*   HCT 35.0* 29.5* 24.4*   MCV 93.1 92.2 92.1   MCH 30.1 29.7 30.6   MCHC 32.3* 32.2* 33.2   RDW 13.7 13.7 13.8   * 125* 108*       Assessment:     Status Post left TFN. Doing well postoperatively.     Plan:      1: Continues current post-op course :  2:  start Deep venous thrombosis prophylaxis   3:  start physical therapy  4:  Continue Pain Control  Hemoglobin 8.1 this morning      
[facility] OCCUPATIONAL THERAPY EVALUATION - ACUTE     NAME: Jeannine Mocnada  : 9/15/1930 (89 y.o.)  MRN: [id number]  CODE STATUS: Full Code  Room: W293/W293-01    Date of Service: 2024    Patient Diagnosis(es): Closed nondisplaced intertrochanteric fracture of left femur, initial encounter ([facility]) [S72.145A]  Fall from standing, initial encounter [W19.XXXA]  Intertrochanteric fracture, closed, left, initial encounter ([facility]) [S72.142A]   Patient Active Problem List    Diagnosis Date Noted    Intertrochanteric fracture, closed, left, initial encounter ([facility]) 2024    Fall from standing 2024    Acute pain due to trauma 2024        No past medical history on file.  Past Surgical History:   Procedure Laterality Date    HIP SURGERY Left 2024    LEFT INTERTROCHANTERIC FRACTURE TROCHANTERIC FIXATION NAILING  ROOM 293  SIMRAN AWARE performed by Chandni Benavides MD at [facility] OR        Restrictions  Restrictions/Precautions: Fall Risk, Up as Tolerated, Weight Bearing      Left Lower Extremity Weight Bearing: Weight Bearing As Tolerated       Safety Devices: Safety Devices  Type of Devices: All fall risk precautions in place;Call light within reach;Chair alarm in place;Left in chair;Nurse notified     Patient's date of birth confirmed: Yes    General:  Chart Reviewed: Yes  Patient assessed for rehabilitation services?: Yes    Subjective  Subjective: \"I'm hurting a little bit this morning\"       Pain at start of treatment: Yes: 6/10    Pain at end of treatment: Yes: 10/10    Location: L thigh  Description: Aching/throbbing  Nursing notified: Yes  RN: Karen  Intervention: RN provided pain medication Cold applied Repositioned    Prior Level of Function:  Social/Functional History  Lives With: Son  Type of Home: House  Home Layout: Two level, Bed/Bath upstairs, Performs ADL's on one level (Pt stays primarily on second floor and comes down only for meals)  Home Access: Stairs to enter with rails  Entrance 
[facility]CCUPATIONAL THERAP[facility] MED SURG TREATMENT NOTE     Date: 2024  Patient Name: Jeannine Moncada        MRN: [id number]  Account: [id number]   : 9/15/1930  (89 y.o.)  Room: [place] [address]    Chart Review:    Restrictions  Restrictions/Precautions  Restrictions/Precautions: Fall Risk, Up as Tolerated, Weight Bearing  Lower Extremity Weight Bearing Restrictions  Left Lower Extremity Weight Bearing: Weight Bearing As Tolerated     Safety:  Safety Devices  Type of Devices: All fall risk precautions in place;Call light within reach;Chair alarm in place;Left in chair    Patient's birthday verified: Yes    Subjective:    Pain at start of treatment: No, but stated that when she moves her pain is about a 6/10.    Pain at end of treatment: Yes: 7-8/10    Location: LLE  Nursing notified: Declined  Intervention: Other: RN already provided main medication this morning.    Objective:    Pt declined a full ADL, but with encouragement she was agreeable to do oral care and change socks.  ADL Status:  ADL  Grooming: Increased time to complete;Setup  Grooming Skilled Clinical Factors: Oral care seated in chair.  LE Dressing: Dependent/Total  LE Dressing Skilled Clinical Factors: Hospital socks.    Therapy key for assistance levels -   Independent/Mod I = Pt. is able to perform task with no assistance but may require a device   Stand by assistance = Pt. does not perform task at an independent level but does not need physical assistance, requires verbal cues  Minimal, Moderate, Maximal Assistance = Pt. requires physical assistance (25%, 50%, 75% assist from helper) for task but is able to actively participate in task   Dependent = Pt. requires total assistance with task and is not able to actively participate with task completion    Orientation Status:  Orientation  Overall Orientation Status: Within Functional Limits    Cognition Status:  Cognition  Overall Cognitive Status: WFL    Seated and Standing 
Physical Therapy  Facility/Department: [facility] MED SURG W293/W293-01  Physical Therapy Discharge      NAME: Jeannine Moncada    : 9/15/1930 (89 y.o.)  MRN: [id number]    Account: [id number]  Gender: female      Patient has been discharged from acute care hospital. DC patient from current PT program.      Electronically signed by Anneliese Tellez PT on 24 at 2:04 PM EDT    
Physical Therapy  Facility/Department: [facility] MED SURG W293/W293-01  Physical Therapy Discharge      NAME: Jeannine Moncada    : 9/15/1930 (89 y.o.)  MRN: [id number]    Account: [id number]  Gender: female      Patient has been discharged from acute care hospital. DC patient from current PT program.      Electronically signed by Anneliese Tellez PT on 7/3/24 at 2:02 PM EDT    
Physical Therapy Med Surg Daily Treatment Note  [place] [facility]  Room: W293/W293-01       NAME: Jeannine Moncada  : 9/15/1930 (89 y.o.)  MRN: [id number]  CODE STATUS: Full Code    Date of Service: 2024    Patient Diagnosis(es): Closed nondisplaced intertrochanteric fracture of left femur, initial encounter ([facility]) [S72.145A]  Fall from standing, initial encounter [W19.XXXA]  Intertrochanteric fracture, closed, left, initial encounter ([facility]) [S72.142A]   Chief Complaint   Patient presents with    Fall     Patient Active Problem List    Diagnosis Date Noted    Acute post-operative pain 2024    Acute blood loss anemia 2024    DAVONTE (acute kidney injury) ([facility]) 2024    Intertrochanteric fracture, closed, left, initial encounter ([facility]) 2024    Fall from standing 2024    Acute pain due to trauma 2024        No past medical history on file.  Past Surgical History:   Procedure Laterality Date    HIP SURGERY Left 2024    LEFT INTERTROCHANTERIC FRACTURE TROCHANTERIC FIXATION NAILING  ROOM 293  SIMRAN AWARE performed by Chandni Benavides MD at [facility] [place]       Chart Reviewed: Yes  General Comment  Comments: POD #4 L ORIF; WBAT    Restrictions:  Restrictions/Precautions: Fall Risk;Up as Tolerated;Weight Bearing  Lower Extremity Weight Bearing Restrictions  Left Lower Extremity Weight Bearing: Weight Bearing As Tolerated    SUBJECTIVE:   Subjective: I just cannot move this leg. I tried and it will not move.    Pain  Pain: 1-2/10 pre and post session, however with movement it's reported at 6/10.     OBJECTIVE:   Orientation  Overall Orientation Status: Within Functional Limits    Bed mobility  Rolling to Right: Minimal assistance  Supine to Sit: Maximum assistance;2 Person assistance  Bed Mobility Comments: Bed slightly elevated with use of hand rails; hand over hand assist for sequencing and task completion. vc's for LLE movement and positioning.    Transfers  Sit to 
Physical Therapy Med Surg Daily Treatment Note  [place] [facility] [place]  Room: W293/W293-01       NAME: Jeannine Moncada  : 9/15/1930 (89 y.o.)  MRN: [id number]  CODE STATUS: Full Code    Date of Service: 2024    Patient Diagnosis(es): Closed nondisplaced intertrochanteric fracture of left femur, initial encounter ([facility]) [S72.145A]  Fall from standing, initial encounter [W19.XXXA]  Intertrochanteric fracture, closed, left, initial encounter ([facility]) [S72.142A]   Chief Complaint   Patient presents with    Fall     Patient Active Problem List    Diagnosis Date Noted    Acute post-operative pain 2024    Acute blood loss anemia 2024    DAVONTE (acute kidney injury) ([facility]) 2024    Intertrochanteric fracture, closed, left, initial encounter ([facility]) 2024    Fall from standing 2024    Acute pain due to trauma 2024        No past medical history on file.  Past Surgical History:   Procedure Laterality Date    HIP SURGERY Left 2024    LEFT INTERTROCHANTERIC FRACTURE TROCHANTERIC FIXATION NAILING  ROOM 293  SIMRAN AWARE performed by Chandni Benavides MD at [facility] [place]            Restrictions:  Restrictions/Precautions: Fall Risk;Up as Tolerated;Weight Bearing  Lower Extremity Weight Bearing Restrictions  Left Lower Extremity Weight Bearing: Weight Bearing As Tolerated    SUBJECTIVE:   Subjective: Nursing approving for patient to be seen by PT.    Pain  Pain: 10/10 pre/post session Lt hip with nursing notified, CP applied post session.    OBJECTIVE:        Bed mobility  Bed Mobility Comments: NT- patient in chair upon arrival/departure.    Transfers  Sit to Stand: Moderate Assistance  Stand to Sit: Moderate Assistance  Comment: Increased effort to complete with cues for sequencing; increased time to complete sit to stand to                         PT Exercises  Exercise Treatment: Seated: Hip adduction, LAQ AAROM x10  Motor Control/Coordination: Standing weightshifts           
Physical Therapy Med Surg Daily Treatment Note  Facility/Department: [facility] 2W ORTHO TELE  Room: W293/W293-01       NAME: Jeannine Moncada  : 9/15/1930 (89 y.o.)  MRN: [id number]  CODE STATUS: Full Code    Date of Service: 2024    Patient Diagnosis(es): Closed nondisplaced intertrochanteric fracture of left femur, initial encounter ([facility]) [S72.145A]  Fall from standing, initial encounter [W19.XXXA]  Intertrochanteric fracture, closed, left, initial encounter ([facility]) [S72.142A]   Chief Complaint   Patient presents with    Fall     Patient Active Problem List    Diagnosis Date Noted    Acute post-operative pain 2024    Acute blood loss anemia 2024    DAVONTE (acute kidney injury) ([facility]) 2024    Intertrochanteric fracture, closed, left, initial encounter ([facility]) 2024    Fall from standing 2024    Acute pain due to trauma 2024        No past medical history on file.  Past Surgical History:   Procedure Laterality Date    HIP SURGERY Left 2024    LEFT INTERTROCHANTERIC FRACTURE TROCHANTERIC FIXATION NAILING  ROOM 293  SIMRAN AWARE performed by Chandni Benavides MD at [facility]       Chart Reviewed: Yes  General Comment  Comments: POD #4 L ORIF; WBAT    Restrictions:  Restrictions/Precautions: Fall Risk;Up as Tolerated;Weight Bearing  Lower Extremity Weight Bearing Restrictions  Left Lower Extremity Weight Bearing: Weight Bearing As Tolerated    SUBJECTIVE:   Subjective: I have to use the bathroom.    Pain  Pain: 5-6/10 Pre and post session; Pt reported the \"pain is going down a little bit.\"     OBJECTIVE:   Orientation  Overall Orientation Status: Within Functional Limits    Bed mobility  Rolling to Left: Minimal assistance  Rolling to Right: Minimal assistance  Supine to Sit: Maximum assistance;2 Person assistance  Sit to Supine: Maximum assistance;2 Person assistance  Bed Mobility Comments: Bed flat with use of hand rails; hand over hand assist for sequencing and task completion. 
Physical Therapy Med Surg Initial Assessment  Facility/Department: [facility] 2W ORTHO TELE  Room: W293/W293-01       NAME: Jeannine Moncada  : 9/15/1930 (89 y.o.)  MRN: [id number]  CODE STATUS: Full Code    Date of Service: 2024    Patient Diagnosis(es): Closed nondisplaced intertrochanteric fracture of left femur, initial encounter ([facility]) [S72.145A]  Fall from standing, initial encounter [W19.XXXA]  Intertrochanteric fracture, closed, left, initial encounter ([facility]) [S72.142A]   Chief Complaint   Patient presents with    Fall     Patient Active Problem List    Diagnosis Date Noted    Intertrochanteric fracture, closed, left, initial encounter ([facility]) 2024    Fall from standing 2024    Acute pain due to trauma 2024        No past medical history on file.  Past Surgical History:   Procedure Laterality Date    HIP SURGERY Left 2024    LEFT INTERTROCHANTERIC FRACTURE TROCHANTERIC FIXATION NAILING  ROOM 293  SIMRAN AWARE performed by Chandni Benavides MD at [facility] OR       Chart Reviewed: Yes  Patient assessed for rehabilitation services?: Yes  Family / Caregiver Present: No  Diagnosis: Intertrochanteric fracture, closed, left, initial encounter ([facility]) s/p ORIF 24  General Comment  Comments: Pt resting in bed - agreeable to PT evaluation    Restrictions:  Restrictions/Precautions: Fall Risk, Up as Tolerated, Weight Bearing  Lower Extremity Weight Bearing Restrictions  Left Lower Extremity Weight Bearing: Weight Bearing As Tolerated     SUBJECTIVE:   Subjective: \"You might hear me yell.\"    Pain  Pain: 10/10 pre and post session pain L hip. RN notified and administering meds. Repositioned for comfort.    Prior Level of Function:  Social/Functional History  Lives With: Son  Type of Home: House  Home Layout: Two level, Bed/Bath upstairs, Performs ADL's on one level (Pt stays primarily on second floor and comes down only for meals)  Home Access: Stairs to enter with rails  Entrance Stairs - Number of 
Physical Therapy Missed Treatment   Facility/Department: [facility] MED SURG W293/W293-01    NAME: Jeannine Moncada    : 9/15/1930 (89 y.o.)  MRN: [id number]    Account: [id number]  Gender: female    Chart reviewed, attempted PT at 0830. Patient unavailable 2° to:    [x] Hold - Hgb 6.9 with patient to receive blood transfusion.         Will attempt PT treatment again at earliest convenience.      Electronically signed by Tiara Werner PTA on 24 at 1:10 PM EDT    
Physical Therapy Missed Treatment   Facility/Department: [facility] MED SURG W293/W293-01    NAME: Jeannine Moncada    : 9/15/1930 (89 y.o.)  MRN: [id number]    Account: [id number]  Gender: female      PT evaluation and treatment orders received. Chart reviewed. Pt admitted for hip fx and is scheduled for fixation this date. Will hold and await post op orders per ortho. Nursing staff notified.      Anneliese Tellez, PT, 24 at 8:28 AM    
Post op report received.  Pt arrived back to 2W room 293.  Pt is awake and alert x4.  BSE performed, pt able to drink sips of water using a straw.  Regular diet order, dietary staff on unit-dinner provided.  Left hip aquacel DSGs in place, scant drainage noted-ice pack in place.  Pt able to wiggle her toes slightly, sensation present but decreased.  Flowtron on pts right leg. VS obtained, external catheter in place.  Call light in reach, safety maintained.   
Pt assessed this AM.  A&O x4.  Pt medicated this AM by nightshift RN, pt reports effective.  BP 95/44 P 49, after recheck 119/49 P 51.  Plan is for pt to have left hip surgery today at 1300 per Ortho NP.  Pt currently NPO.  Pts son was at the bedside this AM and is aware of time of surgery.  Pt currently resting in bed, eyes closed, external cath in place.  Bed alarm on, call light in reach.   
Pt report given to pre op RN.  Pt currently off unit for left hip procedure.  Pts son Constantine present with pt.    
Shift assessment completed and medications given per MAR (patient did not want lidocaine patch and LOVENOX was not given patient refused and platelet count was low) Call light is within reach and bed is in lowest position. Possible discharge today.    Trauma was notified of hemoglobin result of 6.3. José Manuel TOLLIVER placed a order for 1 unit of blood. Awaiting blood bank.  
TRAUMA DAILY PROGRESS NOTE      Patient Name: Jeannine Moncada  Admission Date 2024    Hospital Day: 3  Patient seen and examined on 2024    INTERVAL HISTORY/EVENTS     Background:  Jeannine Moncada is a 89 y.o. female with a PMHx of HTN, HLD who presented to [facility] ED on 2024 s/p mechanical fall from standing. (-) head strike, (-) LOC, (-) AC/AP. Patient lives with son and daughter in law prior to this admission. Reports some memory loss but able to recall events of fall.     Trauma workup found left Intratrochanteric femur fracture. Orthopedics was consulted.  Admitted to the [facility].      Hospital Course:  2024: Presented to ED s/p mechanical fall from standing on . (-) head strike, (-) LOC, (-) AC/AP. Admitted to Trauma. Ortho consulted.  2024: To OR with Ortho for L hip short CMN.  Returned to [facility] following PACU recovery  2024: Uptrend in BUN/Cr. Started on IVF.    24 Hour Events:  This is my first time meeting this patient.  No acute events reported overnight per patient or per nursing. Pain is well controlled on current regimen.  Denies any chest pain, shortness of breath or abdominal pain. Tolerating PO intake with no reported nausea or vomiting.  Voiding via external catheter. No BM since admission.    Vital signs reviewed. Febrile this AM to 100.9 F. Not tachycardic, normotensive, sating appropriately on RA.     Labs reviewed. No leukocytosis. Down-trend in H&H. Stable platelets, remains thrombocytopenic. BMP with acceptable electrolytes. BUN/creatinine down trending.     UOP: 1000mL  BM: 0 occurrences documented.  No documented BM since admission on 2024      PHYSICAL EXAM     Vitals Average, Min and Max for last 24 hours:  Temp: Temp: (!) 100.9 °F (38.3 °C); Temp  Av.1 °F (37.3 °C)  Min: 97.9 °F (36.6 °C)  Max: 100.9 °F (38.3 °C)  Respirations: Resp  Av  Min: 18  Max: 18  Pulse: Pulse  Av.3  Min: 63  Max: 80  Blood Pressure: Systolic (24hrs), Av , 
Soft. Non-distended. Non-tender.  Musculoskeletal: Good ROM in all extremities. No edema. L thigh swelling noted but remains soft and compressible. Surgical dressing intact with mild strike through.   Neurological: Alert, awake, and orientated x 3. Motor and sensory grossly intact. No focal deficits. GCS of 15.   Skin: as above    LABORATORY RESULTS (LAST 24 HOURS)     CBC with Differential:    Lab Results   Component Value Date/Time    WBC 7.3 06/27/2024 05:01 AM    RBC 3.20 06/27/2024 05:01 AM    HGB 9.5 06/27/2024 05:01 AM    HCT 29.5 06/27/2024 05:01 AM     06/27/2024 05:01 AM    MCV 92.2 06/27/2024 05:01 AM    MCH 29.7 06/27/2024 05:01 AM    MCHC 32.2 06/27/2024 05:01 AM    RDW 13.7 06/27/2024 05:01 AM    LYMPHOPCT 18.6 06/27/2024 05:01 AM    MONOPCT 6.8 06/27/2024 05:01 AM    EOSPCT 0.0 06/27/2024 05:01 AM    BASOPCT 0.1 06/27/2024 05:01 AM    MONOSABS 0.5 06/27/2024 05:01 AM    EOSABS 0.0 06/27/2024 05:01 AM    BASOSABS 0.0 06/27/2024 05:01 AM     CMP:    Lab Results   Component Value Date/Time     06/27/2024 05:01 AM    K 4.9 06/27/2024 05:01 AM     06/27/2024 05:01 AM    CO2 23 06/27/2024 05:01 AM    BUN 22 06/27/2024 05:01 AM    CREATININE 1.14 06/27/2024 05:01 AM    LABGLOM 44.7 06/27/2024 05:01 AM    LABGLOM 50.1 09/30/2023 03:50 PM    GLUCOSE 121 06/27/2024 05:01 AM    CALCIUM 8.6 06/27/2024 05:01 AM    BILITOT 0.5 06/26/2024 07:45 PM    ALKPHOS 80 06/26/2024 07:45 PM    AST 19 06/26/2024 07:45 PM    ALT 15 06/26/2024 07:45 PM     Magnesium:    Lab Results   Component Value Date/Time    MG 2.2 06/26/2024 07:45 PM     PT/INR:    Lab Results   Component Value Date/Time    PROTIME 14.2 06/26/2024 08:00 PM    INR 1.1 06/26/2024 08:00 PM     PTT:  No results found for: \"APTT\"    IMAGING RESULTS (PERSONALLY REVIEWED)     All admission and follow up imaging reviewed.    ASSESSMENT & PLAN     Diagnoses:  S/p mechanical fall from standing on 6/26/24  Left intratrochanteric femur 
0.0 06/28/2024 09:22 AM     CMP:    Lab Results   Component Value Date/Time     06/28/2024 09:22 AM    K 4.5 06/28/2024 09:22 AM    K 4.9 06/27/2024 05:01 AM     06/28/2024 09:22 AM    CO2 22 06/28/2024 09:22 AM    BUN 28 06/28/2024 09:22 AM    CREATININE 1.39 06/28/2024 09:22 AM    LABGLOM 35.2 06/28/2024 09:22 AM    LABGLOM 50.1 09/30/2023 03:50 PM    GLUCOSE 158 06/28/2024 09:22 AM    CALCIUM 8.5 06/28/2024 09:22 AM    BILITOT 0.5 06/26/2024 07:45 PM    ALKPHOS 80 06/26/2024 07:45 PM    AST 19 06/26/2024 07:45 PM    ALT 15 06/26/2024 07:45 PM     Magnesium:    Lab Results   Component Value Date/Time    MG 1.9 06/28/2024 09:22 AM     PT/INR:    Lab Results   Component Value Date/Time    PROTIME 14.2 06/26/2024 08:00 PM    INR 1.1 06/26/2024 08:00 PM     PTT:  No results found for: \"APTT\"    IMAGING RESULTS (PERSONALLY REVIEWED)     All admission and follow up imaging reviewed.  No new imaging today.     ASSESSMENT & PLAN   Diagnoses:   S/p mechanical fall from standing on 6/26/24  Left intratrochanteric femur fracture (s/p short CMN on 6/27/2024, Dr. Napier)  Acute pain due to traumatic injury  Postoperative pain  DAVONTE   Acute blood loss anemia     PMHx: HTN, HLD     Incidental Findings: None, JLR 6/27/26.     ASSESSMENT/PLAN:  Neurological: Acute pain due to traumatic injury, postoperative pain. Pain well controlled on current regimen.   - Continue acetaminophen 650mg q6hr scheduled  - Continue robaxin 500mg q6hrs four times daily  - Continue lidoderm patches  - Continue oxycodone but change to 2.5/5mg q4hrs PRN moderate/severe pain  - Discontinue IV Dilaudid 0.25mg q3h PRN breakthrough pain as patient has not required post-op    Cardiovascular: Pre-op EKG obtained, no acute issues. PMH of HTN, HLD.  - Continue BP and HR monitoring per unit protocol    - Continue home Lipitor 40mg nightly  - Continue home Lopressor 50mg BID  - Continue home Norvasc 5mg daily with hold parameters.  - Holding home 
06/30/2024 05:11 AM    BUN 20 06/30/2024 05:11 AM    CREATININE 0.93 06/30/2024 05:11 AM    LABGLOM 57.0 06/30/2024 05:11 AM    LABGLOM 50.1 09/30/2023 03:50 PM    GLUCOSE 111 06/30/2024 05:11 AM    CALCIUM 7.8 06/30/2024 05:11 AM    BILITOT 0.5 06/26/2024 07:45 PM    ALKPHOS 80 06/26/2024 07:45 PM    AST 19 06/26/2024 07:45 PM    ALT 15 06/26/2024 07:45 PM     Magnesium:    Lab Results   Component Value Date/Time    MG 2.0 06/30/2024 05:11 AM     PT/INR:    Lab Results   Component Value Date/Time    PROTIME 14.2 06/26/2024 08:00 PM    INR 1.1 06/26/2024 08:00 PM     PTT:  No results found for: \"APTT\"    IMAGING RESULTS (PERSONALLY REVIEWED)     All admission and follow up imaging reviewed.  No new imaging today.     ASSESSMENT & PLAN   Diagnoses:   S/p mechanical fall from standing on 6/26/24  Left intratrochanteric femur fracture (s/p short CMN on 6/27/2024, Dr. Napier)  Acute pain due to traumatic injury  Postoperative pain  DAVONTE (resolved)  Acute blood loss anemia     PMHx: HTN, HLD     Incidental Findings: None, JLR 6/27/26.     ASSESSMENT/PLAN:  Neurological: Acute pain due to traumatic injury, postoperative pain. Pain well controlled on current regimen.   - Continue acetaminophen 650mg q6hr scheduled  - Continue robaxin 500mg q6hrs four times daily  - Continue lidoderm patches  - Continue oxycodone 2.5/5mg q4hrs PRN moderate/severe pain    Cardiovascular: Pre-op EKG obtained, no acute issues. PMH of HTN, HLD.  - Continue BP and HR monitoring per unit protocol    - Continue home Lipitor 40mg nightly  - Continue home Lopressor 50mg BID  - Continue home Norvasc 5mg daily with hold parameters.  - Continue home Lisinopril 20mg daily  - Continue home ASA 81mg daily     Respiratory: No acute respiratory issues. Acceptable O2 saturations on room air.   - Maintain O2 sats > 92%  - Encourage IS 10x hourly     GI/Diet: Tolerating regular diet.  No documented BM since admission on 6/26/2024  - Regular diet  - Continue 
person, close to but not touching, to perform the activity  Minimal assistance= pt performs 75% or more of the activity; assistance is required to complete the activity  Moderate assistance= pt performs 50% of the activity; assistance is required to complete the activity  Maximal assistance = pt performs 25% of the activity; assistance is required to complete the activity  Dependent = pt requires total physical assistance to accomplish the task  
to accomplish the task  
regimen: Sennakot-S BID, prn MoM. Daily miralax.     Renal/Electrolytes: BMP with acceptable electrolytes, down trend in BUN/creatinine to normal.  - HLIV. Encourage PO intake  - BMP PRN     ID: No active infection. Afebrile, normotensive, and without leukocytosis.  - Periop Abx only  - CBC PRN     Heme: H&H improved s/p 1U pRBC 6/29 and 6/30.  Acute blood loss anemia in the post-op setting.  - No indication for further transfusion  - CBC PRN     Endocrine:  No acute glycemic/endocrine issues.     MSK: s/p short CMN for left intratrochanteric femur fracture s/p mechanical fall from standing on 6/26/24.  - Spines: Cleared  - Weight Bearing Restrictions: WBAT to LLE (post-op)  - Activity: As tolerated (post-op)  - PT/OT: Eval and dispo rec's as able.  - Ortho consulted, appreciate recs:   - WBAT to the LLE   - PT/OT consults   - DVT proophy and pain control      Prophylaxis:   - SCDs and Lovenox 30mg BID for VTE PPx  - No GI PPx indicated     Lines/Tubes/Drains:  - Maintain PIVs  - No indication for garcia catheter, monitor for urinary retention     Dispo: Patient is medically clear for d/c today to SNF. Remain on [facility] for dispo planning.     Accom Status: General Status        Follow up with Orthopaedic Surgery (Dr. Ruiz) in 2 weeks for postop check.  No follow up with Trauma Surgery needed.      José Manuel Waldron PA-C    Please call for any questions or concerns.    This patient's plan of care was discussed and made in collaboration with Trauma Attending physician, Sourav Christian MD.

## 2024-07-08 ENCOUNTER — OFFICE VISIT (OUTPATIENT)
Dept: GERIATRIC MEDICINE | Age: 89
End: 2024-07-08

## 2024-07-08 DIAGNOSIS — S72.142A INTERTROCHANTERIC FRACTURE, CLOSED, LEFT, INITIAL ENCOUNTER (HCC): Primary | ICD-10-CM

## 2024-07-08 DIAGNOSIS — Z91.89 AT RISK FOR INADEQUATE PAIN CONTROL: ICD-10-CM

## 2024-07-09 ENCOUNTER — OFFICE VISIT (OUTPATIENT)
Dept: GERIATRIC MEDICINE | Age: 89
End: 2024-07-09

## 2024-07-09 DIAGNOSIS — I10 PRIMARY HYPERTENSION: ICD-10-CM

## 2024-07-09 DIAGNOSIS — N17.9 AKI (ACUTE KIDNEY INJURY) (HCC): ICD-10-CM
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
## 2024-07-12 ENCOUNTER — APPOINTMENT (OUTPATIENT)
Dept: ORTHOPEDIC SURGERY | Facility: CLINIC | Age: 89
End: 2024-07-12
Payer: MEDICARE

## 2024-07-15 ENCOUNTER — OFFICE VISIT (OUTPATIENT)
Dept: ORTHOPEDIC SURGERY | Facility: CLINIC | Age: 89
End: 2024-07-15
Payer: MEDICARE

## 2024-07-15 ENCOUNTER — HOSPITAL ENCOUNTER (OUTPATIENT)
Dept: RADIOLOGY | Facility: CLINIC | Age: 89
Discharge: HOME | End: 2024-07-15
Payer: MEDICARE

## 2024-07-15 DIAGNOSIS — Z96.642 STATUS POST LEFT HIP REPLACEMENT: ICD-10-CM

## 2024-07-15 PROCEDURE — 99211 OFF/OP EST MAY X REQ PHY/QHP: CPT | Performed by: STUDENT IN AN ORGANIZED HEALTH CARE EDUCATION/TRAINING PROGRAM

## 2024-07-15 PROCEDURE — 73502 X-RAY EXAM HIP UNI 2-3 VIEWS: CPT | Mod: LEFT SIDE | Performed by: STUDENT IN AN ORGANIZED HEALTH CARE EDUCATION/TRAINING PROGRAM

## 2024-07-15 PROCEDURE — 73502 X-RAY EXAM HIP UNI 2-3 VIEWS: CPT | Mod: LT

## 2024-07-15 NOTE — PROGRESS NOTES
S/p L hip IMN 6/27/24. Doing well. Denies numbness or tingling.     Has been compliant with DVT ppx in form of ASA 81 BID.  Was discharged from hospital and supposed to be on Lovenox 40 daily.  At some point this was switched.. Currently at skilled nursing facility    Physical Examination:  The patient appears to be their stated age, is in no apparent distress, and is oriented x3. The patients mood and affect are appropriate. The patients gait is normal. The examination of the limb in question was performed in comparison to the contralateral limb.    Dressing removed  Incision c/d/I  DF, PF intact  Able to flex and extend knee  SILT S, S, SP, DP, T  Foot wwp    Radiographs  Demonstrate a left hip status post IM nailing of intertrochanteric femur fracture.  Alignment maintained.    Assessment:  2 weeks post op    Plan:   Weight bearing status: Weight-bear as tolerated  DVT ppx continued through 6 weeks.  Would like her to be back on Lovenox 40 daily for 4 more weeks.  Have written a note to Jessica Hurtado to facilitate.  Follow up in 1 month with x-ray    Ruby Gamboa MD

## 2024-07-16 ENCOUNTER — OFFICE VISIT (OUTPATIENT)
Dept: GERIATRIC MEDICINE | Age: 89
End: 2024-07-16
Payer: MEDICARE

## 2024-07-16 DIAGNOSIS — E78.5 HYPERLIPIDEMIA, UNSPECIFIED HYPERLIPIDEMIA TYPE: ICD-10-CM

## 2024-07-16 DIAGNOSIS — I10 PRIMARY HYPERTENSION: ICD-10-CM

## 2024-07-16 DIAGNOSIS — N17.9 AKI (ACUTE KIDNEY INJURY) (HCC): Primary | ICD-10-CM

## 2024-07-16 PROCEDURE — 1123F ACP DISCUSS/DSCN MKR DOCD: CPT | Performed by: INTERNAL MEDICINE

## 2024-07-16 PROCEDURE — 99309 SBSQ NF CARE MODERATE MDM 30: CPT | Performed by: INTERNAL MEDICINE

## 2024-07-16 ASSESSMENT — ENCOUNTER SYMPTOMS
SHORTNESS OF BREATH: 0
ABDOMINAL PAIN: 0
CONSTIPATION: 0

## 2024-07-16 NOTE — PROGRESS NOTES
Side effects, adverse effects of the medication prescribed today, as well as treatment plan and result expectations have been discussed withthe patient who expresses understanding and desires to proceed.    I spent a total of 15 minutes on the date of service which included preparing to see the patient, face-to-face patient care, performing a medically appropriate examination, completing clinical documentation, and on counseling/ eductaing the patient and the family.      Please note Nuance Dragon PowerMic III software used for dictation of note,  which may contain minor errors due to ambient noise and indiscriminate speech pickup.

## 2024-07-19 ENCOUNTER — OFFICE VISIT (OUTPATIENT)
Dept: GERIATRIC MEDICINE | Age: 89
End: 2024-07-19

## 2024-07-19 DIAGNOSIS — E78.5 HYPERLIPIDEMIA, UNSPECIFIED HYPERLIPIDEMIA TYPE: ICD-10-CM

## 2024-07-19 DIAGNOSIS — M15.9 OSTEOARTHRITIS OF MULTIPLE JOINTS, UNSPECIFIED OSTEOARTHRITIS TYPE: ICD-10-CM

## 2024-07-19 DIAGNOSIS — I10 PRIMARY HYPERTENSION: Primary | ICD-10-CM

## 2024-07-26 ENCOUNTER — OFFICE VISIT (OUTPATIENT)
Dept: GERIATRIC MEDICINE | Age: 89
End: 2024-07-26

## 2024-07-26 DIAGNOSIS — S72.142S CLOSED INTERTROCHANTERIC FRACTURE OF HIP, LEFT, SEQUELA: Primary | ICD-10-CM

## 2024-07-26 DIAGNOSIS — Z91.81 HX OF BAD FALL: ICD-10-CM

## 2024-07-31 NOTE — PROGRESS NOTES
Patient Name: Jeannine Moncada  Date: 7/30/2024  YOB: 1930  Medical Record Number: [id number]              History of Present Illness:      Review of Systems    Review of Systems: All 14 review of systems negative other than as stated above           No past medical history on file.        Past Surgical History:   Procedure Laterality Date    HIP SURGERY Left 6/27/2024    LEFT INTERTROCHANTERIC FRACTURE TROCHANTERIC FIXATION NAILING  ROOM 293  SIMRAN AWARE performed by Chandni Benavides MD at [facility] OR         Current Outpatient Medications on File Prior to Visit   Medication Sig Dispense Refill    aspirin 81 MG chewable tablet Take 1 tablet by mouth 2 times daily 84 tablet 0    acetaminophen (TYLENOL) 325 MG tablet Take 2 tablets by mouth every 4 hours as needed      metoprolol tartrate (LOPRESSOR) 50 MG tablet Take 1 tablet by mouth 2 times daily      lisinopril (PRINIVIL;ZESTRIL) 20 MG tablet Take 1 tablet by mouth daily      atorvastatin (LIPITOR) 40 MG tablet Take 1 tablet by mouth nightly      amLODIPine (NORVASC) 5 MG tablet Take 1 tablet by mouth daily      senna-docusate (PERICOLACE) 8.6-50 MG per tablet Take 2 tablets by mouth 2 times daily as needed for Constipation       No current facility-administered medications on file prior to visit.       No Known Allergies      No family history on file.      Physical Exam:      Physical Exam    There were no vitals taken for this visit.      .   Lab Results   Component Value Date    WBC 6.2 07/05/2024    HGB 7.6 (A) 07/05/2024    HCT 23.0 (A) 07/05/2024    MCV 90.9 07/05/2024     07/05/2024     Lab Results   Component Value Date/Time     07/05/2024 12:48 PM    K 4.4 07/05/2024 12:48 PM    K 4.9 06/27/2024 05:01 AM     07/05/2024 12:48 PM    CO2 27 07/05/2024 12:48 PM    BUN 18 07/05/2024 12:48 PM    CREATININE 0.9 07/05/2024 12:48 PM    GLUCOSE 111 07/05/2024 12:48 PM    CALCIUM 8.2 07/05/2024 12:48 PM    LABGLOM 76.3 07/01/2024

## 2024-08-08 NOTE — PROGRESS NOTES
SUBJECTIVE:  This 89-year-old woman seen for follow-up visit for status post fall acute kidney kidney injury hypertension weakness patient has not nausea vomiting emesis fevers chills no change in bowel bladder habits no acute confusional episodes      ROS: Limited by cog  The rest of the 14 point ROS negative    PHYSICAL EXAM: VSS per facility record  Pupils are reactive oral mucosa dry chest showed no crackles or wheezing cardiovascular showed a regular rate abdomen soft nontender extremity trace edema    ASSESSMENT & PLAN:   Diagnosis Orders   1. Fall from standing, subsequent encounter        2. DAVONTE (acute kidney injury) (HCC)        3. Primary hypertension          Continue balance training creatinine as able.  Has been on statin therapy monitor to function closely.  Globally weak  BMP CBC pending.          No past medical history on file.      Past Surgical History:   Procedure Laterality Date    HIP SURGERY Left 6/27/2024    LEFT INTERTROCHANTERIC FRACTURE TROCHANTERIC FIXATION NAILING  ROOM 293  SIMRAN AWARE performed by Chandni Benavides MD at [facility] OR         Current Outpatient Medications on File Prior to Visit   Medication Sig Dispense Refill    aspirin 81 MG chewable tablet Take 1 tablet by mouth 2 times daily 84 tablet 0    acetaminophen (TYLENOL) 325 MG tablet Take 2 tablets by mouth every 4 hours as needed      metoprolol tartrate (LOPRESSOR) 50 MG tablet Take 1 tablet by mouth 2 times daily      lisinopril (PRINIVIL;ZESTRIL) 20 MG tablet Take 1 tablet by mouth daily      atorvastatin (LIPITOR) 40 MG tablet Take 1 tablet by mouth nightly      amLODIPine (NORVASC) 5 MG tablet Take 1 tablet by mouth daily      senna-docusate (PERICOLACE) 8.6-50 MG per tablet Take 2 tablets by mouth 2 times daily as needed for Constipation       No current facility-administered medications on file prior to visit.         No family history on file.    Social History     Socioeconomic History    Marital status:

## 2024-08-13 PROBLEM — E78.5 HYPERLIPIDEMIA: Status: ACTIVE | Noted: 2024-08-13

## 2024-08-13 PROBLEM — I10 PRIMARY HYPERTENSION: Status: ACTIVE | Noted: 2024-08-13

## 2024-08-14 ASSESSMENT — ENCOUNTER SYMPTOMS
CONSTIPATION: 0
ABDOMINAL PAIN: 0
SHORTNESS OF BREATH: 0

## 2024-08-14 NOTE — PROGRESS NOTES
SUBJECTIVE:  This 89-year-old woman seen for follow-up visit for hypertension hyperlipidemia General Joint disease patient has not acute pain crisis no recent emesis fevers or chills no change in her bowel bladder habits oral intake has been good      ROS: Coughing intermittently  The rest of the 14 point ROS negative    PHYSICAL EXAM: VSS per facility record  Alert orient x 2 pupils are small oral mucosa moist chest showed diminished breath sounds cardiovascular showed a regular rate abdomen soft nontender extremity trace edema    ASSESSMENT & PLAN:   Diagnosis Orders   1. Primary hypertension        2. Hyperlipidemia, unspecified hyperlipidemia type        3. Osteoarthritis of multiple joints, unspecified osteoarthritis type          Is on calcium channel blocker nontoxic edema is on ACE inhibitor is on beta-blocker function stable continue current bowel regimen continue anti-inflammatory agents functionally stable this time            No past medical history on file.      Past Surgical History:   Procedure Laterality Date    HIP SURGERY Left 6/27/2024    LEFT INTERTROCHANTERIC FRACTURE TROCHANTERIC FIXATION NAILING  ROOM 293  SIMRAN AWARE performed by Chandni Benavides MD at [facility] OR         Current Outpatient Medications on File Prior to Visit   Medication Sig Dispense Refill    aspirin 81 MG chewable tablet Take 1 tablet by mouth 2 times daily 84 tablet 0    acetaminophen (TYLENOL) 325 MG tablet Take 2 tablets by mouth every 4 hours as needed      metoprolol tartrate (LOPRESSOR) 50 MG tablet Take 1 tablet by mouth 2 times daily      lisinopril (PRINIVIL;ZESTRIL) 20 MG tablet Take 1 tablet by mouth daily      atorvastatin (LIPITOR) 40 MG tablet Take 1 tablet by mouth nightly      amLODIPine (NORVASC) 5 MG tablet Take 1 tablet by mouth daily      senna-docusate (PERICOLACE) 8.6-50 MG per tablet Take 2 tablets by mouth 2 times daily as needed for Constipation       No current facility-administered medications

## 2024-08-14 NOTE — PROGRESS NOTES
membranes are moist.      Pharynx: Oropharynx is clear.   Eyes:      Conjunctiva/sclera: Conjunctivae normal.   Cardiovascular:      Rate and Rhythm: Normal rate and regular rhythm.   Pulmonary:      Effort: Pulmonary effort is normal. No respiratory distress.      Breath sounds: Normal breath sounds.   Musculoskeletal:         General: No tenderness.   Skin:     General: Skin is warm and dry.      Comments: No new issues from left hip surgical site   Neurological:      Mental Status: She is alert and oriented to person, place, and time.      Motor: Weakness present.      Gait: Gait abnormal.   Psychiatric:         Mood and Affect: Mood normal.         Behavior: Behavior normal.         Thought Content: Thought content normal.         Assessment:       Diagnosis Orders   1. Closed intertrochanteric fracture of hip, left, sequela        2. Hx of bad fall              Plan:      No orders of the defined types were placed in this encounter.    No orders of the defined types were placed in this encounter.      Pt to f/u with ortho 8/19. PCP appt to be made for 1-2 weeks post dc from SNF. Will supply with walker. Pt to undergo PT/OT at home. No changes to medications at this time.     No follow-ups on file.      UNRULY Perkins    Electronically signed by: UNRULY Perkins on 8/14/2024    Please note orders entered on site at facility after discussion with appropriate facility nursing/therapy/ / nutritional staff. Current longstanding medical problems and acute medical issues addressed with staff. Available data and data elements in on site paper chart reviewed and analyzed.  Current external consultant notes reviewed in on site chart. Ordered laboratory testing and imaging will be reviewed when available.     Side effects, adverse effects of the medication prescribed today, as well as treatment plan and result expectations have been discussed withthe patient who expresses understanding and desires to

## 2024-08-14 NOTE — PROGRESS NOTES
SUBJECTIVE:  This 89-year-old woman seen for follow-up visit for chronic kidney disease hypertension hyperlipidemia patient's function stable at this time no recent nausea vomiting emesis fevers or chills no change in her bowel bladder habits confused at baseline but evidence of acute psychosis no orthostasis      ROS: Denied chest pain or constipation  The rest of the 14 point ROS negative    PHYSICAL EXAM: VSS per facility record  Alert oriented as to people small part of visual acuity oral mucosa is moist poor dentition chest showed which breath sounds cardiovascular showed a regular rate abdomen soft nontender extremities trace edema    ASSESSMENT & PLAN:   Diagnosis Orders   1. DAVONTE (acute kidney injury) (HCC)        2. Primary hypertension        3. Hyperlipidemia, unspecified hyperlipidemia type          Monitor renal function closely blood pressure stable is on a beta-blocker is on ACE inhibitor is on calcium channel blocker monitoring for orthostasis given the extremes of age would like to reduce her regimen as able.  Monitor renal function BMP CBC pending in the next 6 months            No past medical history on file.      Past Surgical History:   Procedure Laterality Date    HIP SURGERY Left 6/27/2024    LEFT INTERTROCHANTERIC FRACTURE TROCHANTERIC FIXATION NAILING  ROOM 293  SIMRAN AWARE performed by Chandni Benavides MD at [facility] OR         Current Outpatient Medications on File Prior to Visit   Medication Sig Dispense Refill    aspirin 81 MG chewable tablet Take 1 tablet by mouth 2 times daily 84 tablet 0    acetaminophen (TYLENOL) 325 MG tablet Take 2 tablets by mouth every 4 hours as needed      metoprolol tartrate (LOPRESSOR) 50 MG tablet Take 1 tablet by mouth 2 times daily      lisinopril (PRINIVIL;ZESTRIL) 20 MG tablet Take 1 tablet by mouth daily      atorvastatin (LIPITOR) 40 MG tablet Take 1 tablet by mouth nightly      amLODIPine (NORVASC) 5 MG tablet Take 1 tablet by mouth daily

## 2024-08-19 ENCOUNTER — APPOINTMENT (OUTPATIENT)
Dept: ORTHOPEDIC SURGERY | Facility: CLINIC | Age: 89
End: 2024-08-19
Payer: MEDICARE

## (undated) DEVICE — HIP PINNING: Brand: MEDLINE INDUSTRIES, INC.

## (undated) DEVICE — STAPLER SKIN H3.9MM WIRE DIA0.58MM CRWN 6.9MM 35 STPL FIX

## (undated) DEVICE — DRESSING HYDROFIBER AQUACEL AG ADVANTAGE 3.5X6 IN

## (undated) DEVICE — SCREW BNE L36MM DIA5MM TIB LT GRN TI ST CANN LOK FULL THRD
Type: IMPLANTABLE DEVICE | Status: NON-FUNCTIONAL
Removed: 2024-06-27

## (undated) DEVICE — SUTURE VICRYL SZ 0 L36IN ABSRB UD L36MM CT-1 1/2 CIR J946H

## (undated) DEVICE — GOWN,AURORA,NONREINFORCED,LARGE: Brand: MEDLINE

## (undated) DEVICE — SUTURE VICRYL SZ 2-0 L36IN ABSRB UD L36MM CT-1 1/2 CIR J945H

## (undated) DEVICE — GLOVE SURG SZ 65 THK91MIL LTX FREE SYN POLYISOPRENE

## (undated) DEVICE — C-ARM: Brand: UNBRANDED

## (undated) DEVICE — SHEET,DRAPE,53X77,STERILE: Brand: MEDLINE

## (undated) DEVICE — IMPLANTABLE DEVICE
Type: IMPLANTABLE DEVICE | Status: NON-FUNCTIONAL
Removed: 2024-06-27

## (undated) DEVICE — SCREW LK TI 5.0MM X 32MM
Type: IMPLANTABLE DEVICE | Status: NON-FUNCTIONAL
Removed: 2024-06-27

## (undated) DEVICE — BIT DRL L330MM DIA4.2MM CALIB 100MM 3 FLUT QUIK CPL

## (undated) DEVICE — GUIDEWIRE ORTH L400MM DIA3.2MM FOR TFN